# Patient Record
Sex: FEMALE | Race: WHITE | NOT HISPANIC OR LATINO | Employment: UNEMPLOYED | URBAN - METROPOLITAN AREA
[De-identification: names, ages, dates, MRNs, and addresses within clinical notes are randomized per-mention and may not be internally consistent; named-entity substitution may affect disease eponyms.]

---
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
## 2023-01-31 ENCOUNTER — LAB VISIT (OUTPATIENT)
Dept: LAB | Facility: HOSPITAL | Age: 18
End: 2023-01-31
Attending: SPECIALIST
Payer: COMMERCIAL

## 2023-01-31 DIAGNOSIS — Z34.82 PRENATAL CARE, SUBSEQUENT PREGNANCY, SECOND TRIMESTER: Primary | ICD-10-CM

## 2023-01-31 DIAGNOSIS — Z13.1 SCREENING FOR DIABETES MELLITUS: ICD-10-CM

## 2023-01-31 LAB
BASOPHILS # BLD AUTO: 0.02 K/UL (ref 0–0.2)
BASOPHILS NFR BLD: 0.2 % (ref 0–1.9)
DIFFERENTIAL METHOD: ABNORMAL
EOSINOPHIL # BLD AUTO: 0.1 K/UL (ref 0–0.5)
EOSINOPHIL NFR BLD: 0.6 % (ref 0–8)
ERYTHROCYTE [DISTWIDTH] IN BLOOD BY AUTOMATED COUNT: 13.2 % (ref 11.5–14.5)
HCT VFR BLD AUTO: 38.5 % (ref 37–48.5)
HGB BLD-MCNC: 12.9 G/DL (ref 12–16)
IMM GRANULOCYTES # BLD AUTO: 0.04 K/UL (ref 0–0.04)
IMM GRANULOCYTES NFR BLD AUTO: 0.4 % (ref 0–0.5)
LYMPHOCYTES # BLD AUTO: 1.3 K/UL (ref 1–4.8)
LYMPHOCYTES NFR BLD: 14.1 % (ref 18–48)
MCH RBC QN AUTO: 30.3 PG (ref 27–31)
MCHC RBC AUTO-ENTMCNC: 33.5 G/DL (ref 32–36)
MCV RBC AUTO: 90 FL (ref 82–98)
MONOCYTES # BLD AUTO: 0.3 K/UL (ref 0.3–1)
MONOCYTES NFR BLD: 3.4 % (ref 4–15)
NEUTROPHILS # BLD AUTO: 7.3 K/UL (ref 1.8–7.7)
NEUTROPHILS NFR BLD: 81.3 % (ref 38–73)
NRBC BLD-RTO: 0 /100 WBC
PLATELET # BLD AUTO: 263 K/UL (ref 150–450)
PMV BLD AUTO: 10.7 FL (ref 9.2–12.9)
RBC # BLD AUTO: 4.26 M/UL (ref 4–5.4)
WBC # BLD AUTO: 8.99 K/UL (ref 3.9–12.7)

## 2023-01-31 PROCEDURE — 85025 COMPLETE CBC W/AUTO DIFF WBC: CPT | Performed by: SPECIALIST

## 2023-01-31 PROCEDURE — 36415 COLL VENOUS BLD VENIPUNCTURE: CPT | Performed by: SPECIALIST

## 2023-01-31 PROCEDURE — 86592 SYPHILIS TEST NON-TREP QUAL: CPT | Performed by: SPECIALIST

## 2023-01-31 PROCEDURE — 87340 HEPATITIS B SURFACE AG IA: CPT | Performed by: SPECIALIST

## 2023-02-01 LAB — RPR SER QL: NORMAL

## 2023-02-02 LAB — HBV SURFACE AG SERPL QL IA: NEGATIVE

## 2023-02-07 LAB
GLUCOSE SERPL-MCNC: 101 MG/DL
GLUCOSE SERPL-MCNC: 96 MG/DL
GLUCOSE SERPL-MCNC: NORMAL MG/DL (ref 70–110)

## 2023-03-10 ENCOUNTER — OFFICE VISIT (OUTPATIENT)
Dept: URGENT CARE | Facility: CLINIC | Age: 18
End: 2023-03-10
Payer: COMMERCIAL

## 2023-03-10 VITALS
TEMPERATURE: 98 F | BODY MASS INDEX: 34.86 KG/M2 | DIASTOLIC BLOOD PRESSURE: 87 MMHG | OXYGEN SATURATION: 97 % | SYSTOLIC BLOOD PRESSURE: 126 MMHG | HEART RATE: 114 BPM | RESPIRATION RATE: 18 BRPM | HEIGHT: 68 IN | WEIGHT: 230 LBS

## 2023-03-10 DIAGNOSIS — J40 BRONCHITIS: Primary | ICD-10-CM

## 2023-03-10 DIAGNOSIS — R09.82 PND (POST-NASAL DRIP): ICD-10-CM

## 2023-03-10 PROCEDURE — 99213 OFFICE O/P EST LOW 20 MIN: CPT | Mod: S$GLB,,, | Performed by: NURSE PRACTITIONER

## 2023-03-10 PROCEDURE — 99213 PR OFFICE/OUTPT VISIT, EST, LEVL III, 20-29 MIN: ICD-10-PCS | Mod: S$GLB,,, | Performed by: NURSE PRACTITIONER

## 2023-03-10 RX ORDER — AZITHROMYCIN 250 MG/1
TABLET, FILM COATED ORAL
Qty: 6 TABLET | Refills: 0 | Status: SHIPPED | OUTPATIENT
Start: 2023-03-10 | End: 2023-03-15

## 2023-03-10 NOTE — PROGRESS NOTES
"Subjective:       Patient ID: Tatiana Cross is a 17 y.o. female.    Vitals:  height is 5' 8" (1.727 m) and weight is 104.3 kg (230 lb). Her oral temperature is 98.2 °F (36.8 °C). Her blood pressure is 126/87 and her pulse is 114 (abnormal). Her respiration is 18 and oxygen saturation is 97%.     Chief Complaint: Cough    Tatiana Cross presents to clinic with cough that has been present for the last 3 weeks.  Patient denies any fever, chills, shortness a breath at this time.  Patient reports that she has also been experiencing some nasal congestion, postnasal drip, headache and chest discomfort that comes along with a cough.  Patient reports that her cough does yield a productive sputum which has at times been yellow and green but a few times has had small streaks of blood noted.  Patient reports that she is not been taking anything for her symptoms.  Patient reports that she has mentioned this to her primary care provider less than 2 weeks ago and they told her there was nothing wrong with her.  Patient reports that her next visit with OBGYN as this upcoming Monday.    Cough  This is a new problem. The current episode started 1 to 4 weeks ago (3 weeks). The problem has been gradually worsening. The problem occurs constantly. The cough is Productive of sputum. Associated symptoms include chest pain, headaches, nasal congestion and postnasal drip.     Constitution: Negative.   HENT:  Positive for postnasal drip.    Neck: neck negative.   Cardiovascular:  Positive for chest pain.   Eyes: Negative.    Respiratory:  Positive for cough.    Gastrointestinal: Negative.    Endocrine: negative.   Genitourinary: Negative.    Musculoskeletal: Negative.    Skin: Negative.    Neurological:  Positive for headaches.     Objective:      Physical Exam   Constitutional: She is oriented to person, place, and time. She appears well-developed.   HENT:   Head: Normocephalic and atraumatic.   Ears:   Right Ear: External ear normal. "   Left Ear: External ear normal.   Nose: Nose normal.   Mouth/Throat: Mucous membranes are normal.   Eyes: Conjunctivae and lids are normal.   Neck: Trachea normal. Neck supple.   Cardiovascular: Normal rate, regular rhythm and normal heart sounds.   Pulmonary/Chest: Effort normal and breath sounds normal. No respiratory distress.   Abdominal: Normal appearance and bowel sounds are normal. She exhibits no distension and no mass. Soft. There is no abdominal tenderness.   Musculoskeletal: Normal range of motion.         General: Normal range of motion.   Neurological: She is alert and oriented to person, place, and time. She has normal strength.   Skin: Skin is warm, dry, intact, not diaphoretic and not pale.   Psychiatric: Her speech is normal and behavior is normal. Judgment and thought content normal.   Nursing note and vitals reviewed.      Assessment:       1. Bronchitis    2. PND (post-nasal drip)          Plan:         Bronchitis  -     azithromycin (Z-SANDRA) 250 MG tablet; Take 2 tablets by mouth on day 1; Take 1 tablet by mouth on days 2-5  Dispense: 6 tablet; Refill: 0    PND (post-nasal drip)

## 2023-03-18 ENCOUNTER — HOSPITAL ENCOUNTER (OUTPATIENT)
Facility: HOSPITAL | Age: 18
Discharge: HOME | End: 2023-03-18
Attending: SPECIALIST | Admitting: SPECIALIST
Payer: COMMERCIAL

## 2023-03-18 VITALS
HEART RATE: 96 BPM | RESPIRATION RATE: 20 BRPM | DIASTOLIC BLOOD PRESSURE: 57 MMHG | OXYGEN SATURATION: 97 % | SYSTOLIC BLOOD PRESSURE: 131 MMHG

## 2023-03-18 DIAGNOSIS — N89.8 VAGINAL DISCHARGE: ICD-10-CM

## 2023-03-18 LAB
BACTERIA #/AREA URNS HPF: ABNORMAL /HPF
BILIRUB UR QL STRIP: NEGATIVE
CLARITY UR: ABNORMAL
COLOR UR: YELLOW
GLUCOSE UR QL STRIP: NEGATIVE
HGB UR QL STRIP: NEGATIVE
HYALINE CASTS #/AREA URNS LPF: 16 /LPF
KETONES UR QL STRIP: NEGATIVE
LEUKOCYTE ESTERASE UR QL STRIP: ABNORMAL
MICROSCOPIC COMMENT: ABNORMAL
NITRITE UR QL STRIP: NEGATIVE
PH UR STRIP: 6 [PH] (ref 5–8)
PROT UR QL STRIP: ABNORMAL
RBC #/AREA URNS HPF: 4 /HPF (ref 0–4)
SP GR UR STRIP: 1.02 (ref 1–1.03)
SQUAMOUS #/AREA URNS HPF: 9 /HPF
URN SPEC COLLECT METH UR: ABNORMAL
UROBILINOGEN UR STRIP-ACNC: NEGATIVE EU/DL
WBC #/AREA URNS HPF: 13 /HPF (ref 0–5)

## 2023-03-18 PROCEDURE — 99211 OFF/OP EST MAY X REQ PHY/QHP: CPT | Mod: 25

## 2023-03-18 PROCEDURE — 81001 URINALYSIS AUTO W/SCOPE: CPT | Performed by: SPECIALIST

## 2023-03-18 PROCEDURE — 87086 URINE CULTURE/COLONY COUNT: CPT | Performed by: SPECIALIST

## 2023-03-18 PROCEDURE — 59025 FETAL NON-STRESS TEST: CPT

## 2023-03-18 NOTE — DISCHARGE INSTRUCTIONS
Pt is to return to hospital if symptoms worse, leaking of fluid, vaginal bleeding, contractions or concerns. Follow up with dr luevano in office next week or sooner for problems. No sex . Pelvic rest until cleared per dr luevano. Drink plenty of fluids.

## 2023-03-18 NOTE — NURSING
1805- Dr luevano called. Pt tracing reactive. 1 small ctx. Pt has no pain. No leaking. Orders to check pt. If no dilation may send home on pelvic rest and follow up with dr luevano next week.....    1815- pt sve 0/0/-4. No fluid on glove. Small amount white mucous on glove..    1830- pt dishcharged home in stable condition.

## 2023-03-18 NOTE — NURSING
[facility]  History & Physical  Obstetrics   Labor and Delivery Triage    SUBJECTIVE:     Patient Info:  Tatiana Cross         17 y.o.    female    2005     Chief Complaint/Reason for Admission: lost mucous plug    History of Present Illness:  Patient presents at 31 and 5/7 weeks gestation with EDC 05/15/2023.  She presents with contractions every 0 minutes lasting  0  seconds. Other associated symptoms include vaginal discharge. Fetal Movement: normal. Her current obstetrical history is significant for no history.  She reports no leaking and just lost mucous plug and wants to be checked., denies abdominal pain leaking of fluid. Pt reportes losing mucous plug today. Pt mother took picture of mucous plug. Yellow with pink tinge. Small amount. Pt denies odor. Denies abdominal trauma. Pt did report to having sex yesterday morning.  Denies pain. Pt laughing.  .    OB History:   OB History          1    Para   0    Term   0       0    AB   0    Living   0         SAB   0    IAB   0    Ectopic   0    Multiple   0    Live Births   0                   Estimated Date of Delivery: 5/15/23     Gestational Age:  31w5d    Past Medical History:  Past Medical History:   Diagnosis Date    Anxiety     Depression         Past Surgical History:  Past Surgical History:   Procedure Laterality Date    COLONOSCOPY N/A 2020    Procedure: COLONOSCOPY;  Surgeon: Bunny Eaton MD;  Location: [facility];  Service: Endoscopy;  Laterality: N/A;    COLONOSCOPY         Social History:   Alcohol/Tobacco:  Social History     Tobacco Use    Smoking status: Never    Smokeless tobacco: Never   Substance Use Topics    Alcohol use: Not Currently      Drug Use:  Social History     Substance and Sexual Activity   Drug Use Never       Family History:  Family History   Problem Relation Age of Onset    Thyroid disease Mother     Heart disease Paternal Grandmother        Allergies:  Review of patient's allergies  indicates:  No Known Allergies    Meds Prior to Arrival:  Prior to Admission medications    Medication Sig Start Date End Date Taking? Authorizing Provider   albuterol (PROVENTIL/VENTOLIN HFA) 90 mcg/actuation inhaler Inhale 2 puffs into the lungs every 4 (four) hours as needed. 5/25/21   Historical Provider   FLUoxetine 20 MG capsule Take 20 mg by mouth once daily. 7/12/22   Historical Provider   PNV cmb#95-ferrous fumarate-FA (PRENATAL) 28 mg iron- 800 mcg Tab Take 1 tablet by mouth once daily.    Historical Provider   VYVANSE 50 mg capsule Take 50 mg by mouth every morning. 4/5/22   Historical Provider        Review of Systems:  na    OBJECTIVE:     Recent Vitals:  LMP 08/12/2022 (Exact Date)     Exam:    na    na    Lab Results:  No results found for this or any previous visit (from the past 36 hour(s)).  Lab Results   Component Value Date    GROUPTRH O POS 11/09/2022          Diagnostic Studies: fetal monitor    Baseline: 140 bpm    labs: ua sent to lab/       ASSESSMENT/PLAN:     Dx:Vaginal discharge [N89.8]  There are no hospital problems to display for this patient.        Admit to MD: Sanjay Thapa MD    Admit to: triage outpatient    na    Code Status: Full Code       Diagnostic Plan/Orders:  Orders Placed This Encounter   Procedures    Urinalysis, Reflex to Urine Culture Urine, Clean Catch    Vital Signs (Specify Frequency)    Fetal monitoring    Continuous tocometry    Notify clinical provider    Notify clinical provider    Full code    Place in Outpatient        Scheduled Meds/IV Therapy:             PRN Meds:

## 2023-03-20 LAB
BACTERIA UR CULT: NORMAL
BACTERIA UR CULT: NORMAL

## 2023-04-10 LAB — PRENATAL STREP B CULTURE: POSITIVE

## 2023-04-21 ENCOUNTER — HOSPITAL ENCOUNTER (OUTPATIENT)
Facility: HOSPITAL | Age: 18
Discharge: HOME | End: 2023-04-22
Attending: SPECIALIST | Admitting: SPECIALIST
Payer: COMMERCIAL

## 2023-04-21 DIAGNOSIS — O36.8190 DECREASED FETAL MOVEMENT: ICD-10-CM

## 2023-04-21 DIAGNOSIS — R58 HEMORRHAGE: Primary | ICD-10-CM

## 2023-04-21 LAB
BACTERIA #/AREA URNS HPF: NEGATIVE /HPF
BILIRUB UR QL STRIP: NEGATIVE
CLARITY UR: CLEAR
COLOR UR: YELLOW
CTP QC/QA: YES
CTP QC/QA: YES
GLUCOSE UR QL STRIP: NEGATIVE
HGB UR QL STRIP: NEGATIVE
HYALINE CASTS #/AREA URNS LPF: 8 /LPF
KETONES UR QL STRIP: NEGATIVE
LEUKOCYTE ESTERASE UR QL STRIP: ABNORMAL
MICROSCOPIC COMMENT: ABNORMAL
NITRITE UR QL STRIP: NEGATIVE
PH UR STRIP: 7 [PH] (ref 5–8)
POC PH, VAGINAL: 5.5 (ref 4.5–5.5)
PROT UR QL STRIP: ABNORMAL
RBC #/AREA URNS HPF: 1 /HPF (ref 0–4)
RUPTURE OF MEMBRANE: POSITIVE
RUPTURE OF MEMBRANE: POSITIVE
SP GR UR STRIP: 1.01 (ref 1–1.03)
SQUAMOUS #/AREA URNS HPF: 8 /HPF
URN SPEC COLLECT METH UR: ABNORMAL
UROBILINOGEN UR STRIP-ACNC: NEGATIVE EU/DL
WBC #/AREA URNS HPF: 7 /HPF (ref 0–5)

## 2023-04-21 PROCEDURE — 81001 URINALYSIS AUTO W/SCOPE: CPT | Performed by: OBSTETRICS & GYNECOLOGY

## 2023-04-21 RX ORDER — SODIUM CHLORIDE, SODIUM LACTATE, POTASSIUM CHLORIDE, CALCIUM CHLORIDE 600; 310; 30; 20 MG/100ML; MG/100ML; MG/100ML; MG/100ML
INJECTION, SOLUTION INTRAVENOUS CONTINUOUS
Status: DISCONTINUED | OUTPATIENT
Start: 2023-04-21 | End: 2023-04-22 | Stop reason: HOSPADM

## 2023-04-21 RX ORDER — ONDANSETRON 4 MG/1
8 TABLET, ORALLY DISINTEGRATING ORAL EVERY 8 HOURS PRN
Status: DISCONTINUED | OUTPATIENT
Start: 2023-04-21 | End: 2023-04-22

## 2023-04-21 RX ORDER — ACETAMINOPHEN 500 MG
500 TABLET ORAL EVERY 6 HOURS PRN
Status: DISCONTINUED | OUTPATIENT
Start: 2023-04-21 | End: 2023-04-22 | Stop reason: HOSPADM

## 2023-04-22 VITALS
TEMPERATURE: 98 F | SYSTOLIC BLOOD PRESSURE: 136 MMHG | DIASTOLIC BLOOD PRESSURE: 89 MMHG | BODY MASS INDEX: 29.55 KG/M2 | RESPIRATION RATE: 18 BRPM | OXYGEN SATURATION: 98 % | HEIGHT: 68 IN | WEIGHT: 195 LBS | HEART RATE: 68 BPM

## 2023-04-22 LAB
ABO + RH BLD: NORMAL
BASOPHILS # BLD AUTO: 0.02 K/UL (ref 0–0.2)
BASOPHILS NFR BLD: 0.2 % (ref 0–1.9)
BLD GP AB SCN CELLS X3 SERPL QL: NORMAL
DIFFERENTIAL METHOD: ABNORMAL
EOSINOPHIL # BLD AUTO: 0.1 K/UL (ref 0–0.5)
EOSINOPHIL NFR BLD: 1 % (ref 0–8)
ERYTHROCYTE [DISTWIDTH] IN BLOOD BY AUTOMATED COUNT: 12.9 % (ref 11.5–14.5)
HCT VFR BLD AUTO: 34.2 % (ref 37–48.5)
HGB BLD-MCNC: 12 G/DL (ref 12–16)
IMM GRANULOCYTES # BLD AUTO: 0.02 K/UL (ref 0–0.04)
IMM GRANULOCYTES NFR BLD AUTO: 0.2 % (ref 0–0.5)
LYMPHOCYTES # BLD AUTO: 2.1 K/UL (ref 1–4.8)
LYMPHOCYTES NFR BLD: 22.4 % (ref 18–48)
MCH RBC QN AUTO: 30 PG (ref 27–31)
MCHC RBC AUTO-ENTMCNC: 35.1 G/DL (ref 32–36)
MCV RBC AUTO: 86 FL (ref 82–98)
MONOCYTES # BLD AUTO: 0.5 K/UL (ref 0.3–1)
MONOCYTES NFR BLD: 5.8 % (ref 4–15)
NEUTROPHILS # BLD AUTO: 6.6 K/UL (ref 1.8–7.7)
NEUTROPHILS NFR BLD: 70.4 % (ref 38–73)
NRBC BLD-RTO: 0 /100 WBC
PLATELET # BLD AUTO: 239 K/UL (ref 150–450)
PMV BLD AUTO: 11.1 FL (ref 9.2–12.9)
POC PH, VAGINAL: 5.5 (ref 4.5–5.5)
RBC # BLD AUTO: 4 M/UL (ref 4–5.4)
WBC # BLD AUTO: 9.33 K/UL (ref 3.9–12.7)

## 2023-04-22 PROCEDURE — 25000003 PHARM REV CODE 250: Performed by: SPECIALIST

## 2023-04-22 PROCEDURE — 63600175 PHARM REV CODE 636 W HCPCS: Performed by: SPECIALIST

## 2023-04-22 PROCEDURE — 86592 SYPHILIS TEST NON-TREP QUAL: CPT | Performed by: SPECIALIST

## 2023-04-22 PROCEDURE — 85025 COMPLETE CBC W/AUTO DIFF WBC: CPT | Performed by: SPECIALIST

## 2023-04-22 PROCEDURE — 99211 OFF/OP EST MAY X REQ PHY/QHP: CPT

## 2023-04-22 PROCEDURE — 86900 BLOOD TYPING SEROLOGIC ABO: CPT | Performed by: SPECIALIST

## 2023-04-22 RX ORDER — METHYLERGONOVINE MALEATE 0.2 MG/ML
200 INJECTION INTRAVENOUS
Status: DISCONTINUED | OUTPATIENT
Start: 2023-04-22 | End: 2023-04-22 | Stop reason: HOSPADM

## 2023-04-22 RX ORDER — MISOPROSTOL 200 UG/1
800 TABLET ORAL
Status: CANCELLED | OUTPATIENT
Start: 2023-04-22

## 2023-04-22 RX ORDER — CALCIUM CARBONATE 200(500)MG
500 TABLET,CHEWABLE ORAL 3 TIMES DAILY PRN
Status: DISCONTINUED | OUTPATIENT
Start: 2023-04-22 | End: 2023-04-22 | Stop reason: HOSPADM

## 2023-04-22 RX ORDER — TRANEXAMIC ACID 10 MG/ML
1000 INJECTION, SOLUTION INTRAVENOUS ONCE AS NEEDED
Status: DISCONTINUED | OUTPATIENT
Start: 2023-04-22 | End: 2023-04-22 | Stop reason: HOSPADM

## 2023-04-22 RX ORDER — OXYTOCIN-SODIUM CHLORIDE 0.9% IV SOLN 30 UNIT/500ML 30-0.9/5 UT/ML-%
30 SOLUTION INTRAVENOUS ONCE AS NEEDED
Status: DISCONTINUED | OUTPATIENT
Start: 2023-04-22 | End: 2023-04-22 | Stop reason: HOSPADM

## 2023-04-22 RX ORDER — METHYLERGONOVINE MALEATE 0.2 MG/ML
200 INJECTION INTRAVENOUS
Status: CANCELLED | OUTPATIENT
Start: 2023-04-22

## 2023-04-22 RX ORDER — SIMETHICONE 80 MG
1 TABLET,CHEWABLE ORAL 4 TIMES DAILY PRN
Status: DISCONTINUED | OUTPATIENT
Start: 2023-04-22 | End: 2023-04-22 | Stop reason: HOSPADM

## 2023-04-22 RX ORDER — CARBOPROST TROMETHAMINE 250 UG/ML
250 INJECTION, SOLUTION INTRAMUSCULAR
Status: DISCONTINUED | OUTPATIENT
Start: 2023-04-22 | End: 2023-04-22 | Stop reason: HOSPADM

## 2023-04-22 RX ORDER — SODIUM CHLORIDE, SODIUM LACTATE, POTASSIUM CHLORIDE, CALCIUM CHLORIDE 600; 310; 30; 20 MG/100ML; MG/100ML; MG/100ML; MG/100ML
INJECTION, SOLUTION INTRAVENOUS CONTINUOUS
Status: DISCONTINUED | OUTPATIENT
Start: 2023-04-22 | End: 2023-04-22 | Stop reason: HOSPADM

## 2023-04-22 RX ORDER — CARBOPROST TROMETHAMINE 250 UG/ML
250 INJECTION, SOLUTION INTRAMUSCULAR
Status: CANCELLED | OUTPATIENT
Start: 2023-04-22

## 2023-04-22 RX ORDER — ONDANSETRON 4 MG/1
8 TABLET, ORALLY DISINTEGRATING ORAL EVERY 8 HOURS PRN
Status: DISCONTINUED | OUTPATIENT
Start: 2023-04-22 | End: 2023-04-22 | Stop reason: HOSPADM

## 2023-04-22 RX ORDER — LIDOCAINE HYDROCHLORIDE 10 MG/ML
10 INJECTION, SOLUTION EPIDURAL; INFILTRATION; INTRACAUDAL; PERINEURAL ONCE AS NEEDED
Status: DISCONTINUED | OUTPATIENT
Start: 2023-04-22 | End: 2023-04-22 | Stop reason: HOSPADM

## 2023-04-22 RX ORDER — SODIUM CHLORIDE 9 MG/ML
INJECTION, SOLUTION INTRAVENOUS
Status: DISCONTINUED | OUTPATIENT
Start: 2023-04-22 | End: 2023-04-22 | Stop reason: HOSPADM

## 2023-04-22 RX ORDER — VALACYCLOVIR HYDROCHLORIDE 500 MG/1
500 TABLET, FILM COATED ORAL
COMMUNITY
Start: 2023-04-18

## 2023-04-22 RX ORDER — OXYTOCIN-SODIUM CHLORIDE 0.9% IV SOLN 30 UNIT/500ML 30-0.9/5 UT/ML-%
2 SOLUTION INTRAVENOUS ONCE AS NEEDED
Status: DISCONTINUED | OUTPATIENT
Start: 2023-04-22 | End: 2023-04-22 | Stop reason: HOSPADM

## 2023-04-22 RX ORDER — MISOPROSTOL 200 UG/1
800 TABLET ORAL ONCE AS NEEDED
Status: DISCONTINUED | OUTPATIENT
Start: 2023-04-22 | End: 2023-04-22 | Stop reason: HOSPADM

## 2023-04-22 RX ADMIN — PENICILLIN G POTASSIUM 5 MILLION UNITS: 5000000 INJECTION, POWDER, FOR SOLUTION INTRAMUSCULAR; INTRAVENOUS at 02:04

## 2023-04-22 NOTE — DISCHARGE INSTRUCTIONS
Keep your scheduled appointment with your provider.    Call your Doctor if you have any of the following:  Temperature above 100 degrees  Nausea, vomiting and/or diarrhea  Severe headache unrelieved by over the counter Tylenol  Dizziness, or blurred vision  Notable increase in swelling of hands or feet  Notable swelling of face and lips  Difficulty, pain or burning with urination  Foul smelling vaginal discharge  Decreased fetal movement      Come to the hospital if you have any of the following symptoms:  Your water breaks  More than 4-6 contractions in 1 hour for 2 or more hours prior to 36 weeks  Vaginal bleeding like a period    After 28 weeks, you should feel 10 distinct fetal movements within a 2 hour period.    It is recommended that you drink 1/2 a gallon of water each day.  Tea, Soda and Juice are  in addition to this.

## 2023-04-22 NOTE — NURSING
Verbal report received from Dr. Thapa. Negative valsalva and pooling. Negative fern. New orders to monitor til 11 am, then discharge with precautions. If FHR tracing reactive, ok to ambulate. Patient and family updated on plan of care. Agrees with plan at this time.

## 2023-04-22 NOTE — NURSING
Dr. Thapa at bedside. Sterile spec exam performed. Swab for fern collected. Tolerated well. Patient and family updated on plan of care at this time. Will continue to monitor.

## 2023-04-22 NOTE — NURSING
Called to room by patient. States that she was really hoping to have her baby today. Expressed concerns about baby being big and really did not want that to happen. Explained risks (difficulty regulating temperature, jaundice, and developmental delays) and benefits of waiting for spontaneous labor to occur or IOL at 39 weeks. Verbalized understanding. Dr. Thapa notified of patients complaints.

## 2023-04-25 ENCOUNTER — HOSPITAL ENCOUNTER (OUTPATIENT)
Facility: HOSPITAL | Age: 18
Discharge: HOME | End: 2023-04-25
Attending: SPECIALIST | Admitting: SPECIALIST
Payer: COMMERCIAL

## 2023-04-25 VITALS — HEART RATE: 73 BPM | SYSTOLIC BLOOD PRESSURE: 132 MMHG | DIASTOLIC BLOOD PRESSURE: 74 MMHG

## 2023-04-25 DIAGNOSIS — O36.8190 DECREASED FETAL MOVEMENT: ICD-10-CM

## 2023-04-25 LAB — RPR SER QL: NORMAL

## 2023-04-25 PROCEDURE — 59025 FETAL NON-STRESS TEST: CPT

## 2023-05-01 ENCOUNTER — HOSPITAL ENCOUNTER (INPATIENT)
Facility: HOSPITAL | Age: 18
LOS: 3 days | Discharge: HOME | End: 2023-05-04
Attending: SPECIALIST | Admitting: SPECIALIST
Payer: COMMERCIAL

## 2023-05-01 DIAGNOSIS — R58 HEMORRHAGE: ICD-10-CM

## 2023-05-01 DIAGNOSIS — N85.8 ALTERATION IN COMFORT ASSOCIATED WITH UTERINE CONTRACTIONS: ICD-10-CM

## 2023-05-01 PROCEDURE — 86592 SYPHILIS TEST NON-TREP QUAL: CPT | Performed by: SPECIALIST

## 2023-05-01 PROCEDURE — 86900 BLOOD TYPING SEROLOGIC ABO: CPT | Performed by: SPECIALIST

## 2023-05-01 PROCEDURE — 85025 COMPLETE CBC W/AUTO DIFF WBC: CPT | Performed by: SPECIALIST

## 2023-05-01 PROCEDURE — 12000002 HC ACUTE/MED SURGE SEMI-PRIVATE ROOM

## 2023-05-01 PROCEDURE — 25000003 PHARM REV CODE 250: Performed by: SPECIALIST

## 2023-05-01 PROCEDURE — 63600175 PHARM REV CODE 636 W HCPCS: Performed by: SPECIALIST

## 2023-05-01 RX ORDER — SODIUM CHLORIDE, SODIUM LACTATE, POTASSIUM CHLORIDE, CALCIUM CHLORIDE 600; 310; 30; 20 MG/100ML; MG/100ML; MG/100ML; MG/100ML
INJECTION, SOLUTION INTRAVENOUS CONTINUOUS
Status: DISCONTINUED | OUTPATIENT
Start: 2023-05-02 | End: 2023-05-04

## 2023-05-01 RX ORDER — TRANEXAMIC ACID 10 MG/ML
1000 INJECTION, SOLUTION INTRAVENOUS ONCE AS NEEDED
Status: DISCONTINUED | OUTPATIENT
Start: 2023-05-01 | End: 2023-05-04 | Stop reason: HOSPADM

## 2023-05-01 RX ORDER — CALCIUM CARBONATE 200(500)MG
500 TABLET,CHEWABLE ORAL 3 TIMES DAILY PRN
Status: DISCONTINUED | OUTPATIENT
Start: 2023-05-02 | End: 2023-05-04 | Stop reason: HOSPADM

## 2023-05-01 RX ORDER — LIDOCAINE HYDROCHLORIDE 10 MG/ML
10 INJECTION INFILTRATION; PERINEURAL ONCE AS NEEDED
Status: DISCONTINUED | OUTPATIENT
Start: 2023-05-02 | End: 2023-05-04 | Stop reason: HOSPADM

## 2023-05-01 RX ORDER — CARBOPROST TROMETHAMINE 250 UG/ML
250 INJECTION, SOLUTION INTRAMUSCULAR
Status: DISCONTINUED | OUTPATIENT
Start: 2023-05-02 | End: 2023-05-04 | Stop reason: HOSPADM

## 2023-05-01 RX ORDER — SODIUM CHLORIDE 9 MG/ML
INJECTION, SOLUTION INTRAVENOUS
Status: DISCONTINUED | OUTPATIENT
Start: 2023-05-02 | End: 2023-05-04 | Stop reason: HOSPADM

## 2023-05-01 RX ORDER — MISOPROSTOL 200 UG/1
800 TABLET ORAL ONCE AS NEEDED
Status: COMPLETED | OUTPATIENT
Start: 2023-05-02 | End: 2023-05-02

## 2023-05-01 RX ORDER — METHYLERGONOVINE MALEATE 0.2 MG/ML
200 INJECTION INTRAVENOUS
Status: DISCONTINUED | OUTPATIENT
Start: 2023-05-02 | End: 2023-05-02

## 2023-05-01 RX ORDER — SIMETHICONE 80 MG
1 TABLET,CHEWABLE ORAL 4 TIMES DAILY PRN
Status: DISCONTINUED | OUTPATIENT
Start: 2023-05-02 | End: 2023-05-04 | Stop reason: HOSPADM

## 2023-05-01 RX ORDER — OXYTOCIN-SODIUM CHLORIDE 0.9% IV SOLN 30 UNIT/500ML 30-0.9/5 UT/ML-%
30 SOLUTION INTRAVENOUS ONCE AS NEEDED
Status: DISCONTINUED | OUTPATIENT
Start: 2023-05-01 | End: 2023-05-04 | Stop reason: HOSPADM

## 2023-05-01 RX ORDER — PROCHLORPERAZINE EDISYLATE 5 MG/ML
5 INJECTION INTRAMUSCULAR; INTRAVENOUS EVERY 6 HOURS PRN
Status: DISCONTINUED | OUTPATIENT
Start: 2023-05-02 | End: 2023-05-04 | Stop reason: HOSPADM

## 2023-05-01 RX ORDER — OXYTOCIN-SODIUM CHLORIDE 0.9% IV SOLN 30 UNIT/500ML 30-0.9/5 UT/ML-%
0-30 SOLUTION INTRAVENOUS CONTINUOUS
Status: DISCONTINUED | OUTPATIENT
Start: 2023-05-02 | End: 2023-05-04

## 2023-05-01 RX ORDER — OXYTOCIN-SODIUM CHLORIDE 0.9% IV SOLN 30 UNIT/500ML 30-0.9/5 UT/ML-%
2 SOLUTION INTRAVENOUS ONCE AS NEEDED
Status: DISCONTINUED | OUTPATIENT
Start: 2023-05-01 | End: 2023-05-04 | Stop reason: HOSPADM

## 2023-05-01 RX ORDER — ONDANSETRON 4 MG/1
8 TABLET, ORALLY DISINTEGRATING ORAL EVERY 8 HOURS PRN
Status: DISCONTINUED | OUTPATIENT
Start: 2023-05-02 | End: 2023-05-02

## 2023-05-01 RX ADMIN — PENICILLIN G POTASSIUM 5 MILLION UNITS: 5000000 INJECTION, POWDER, FOR SOLUTION INTRAMUSCULAR; INTRAVENOUS at 11:05

## 2023-05-01 RX ADMIN — SODIUM CHLORIDE, SODIUM LACTATE, POTASSIUM CHLORIDE, AND CALCIUM CHLORIDE 1000 ML: .6; .31; .03; .02 INJECTION, SOLUTION INTRAVENOUS at 11:05

## 2023-05-02 ENCOUNTER — ANESTHESIA EVENT (OUTPATIENT)
Dept: OBSTETRICS AND GYNECOLOGY | Facility: HOSPITAL | Age: 18
End: 2023-05-02
Payer: COMMERCIAL

## 2023-05-02 ENCOUNTER — ANESTHESIA (OUTPATIENT)
Dept: OBSTETRICS AND GYNECOLOGY | Facility: HOSPITAL | Age: 18
End: 2023-05-02
Payer: COMMERCIAL

## 2023-05-02 LAB
ABO + RH BLD: NORMAL
AMPHET+METHAMPHET UR QL: NEGATIVE
BARBITURATES UR QL SCN>200 NG/ML: NEGATIVE
BASOPHILS # BLD AUTO: 0.02 K/UL (ref 0–0.2)
BASOPHILS NFR BLD: 0.2 % (ref 0–1.9)
BENZODIAZ UR QL SCN>200 NG/ML: NEGATIVE
BILIRUB UR QL STRIP: NEGATIVE
BLD GP AB SCN CELLS X3 SERPL QL: NORMAL
BUPRENORPHINE UR QL: NEGATIVE
BZE UR QL SCN: NEGATIVE
CANNABINOIDS UR QL SCN: NEGATIVE
CLARITY UR: CLEAR
COLOR UR: YELLOW
CREAT UR-MCNC: 159 MG/DL (ref 15–325)
DIFFERENTIAL METHOD: ABNORMAL
EOSINOPHIL # BLD AUTO: 0 K/UL (ref 0–0.5)
EOSINOPHIL NFR BLD: 0.2 % (ref 0–8)
ERYTHROCYTE [DISTWIDTH] IN BLOOD BY AUTOMATED COUNT: 13.1 % (ref 11.5–14.5)
GLUCOSE UR QL STRIP: NEGATIVE
HCT VFR BLD AUTO: 37.4 % (ref 37–48.5)
HGB BLD-MCNC: 12.9 G/DL (ref 12–16)
HGB UR QL STRIP: NEGATIVE
IMM GRANULOCYTES # BLD AUTO: 0.04 K/UL (ref 0–0.04)
IMM GRANULOCYTES NFR BLD AUTO: 0.3 % (ref 0–0.5)
KETONES UR QL STRIP: 100
LEUKOCYTE ESTERASE UR QL STRIP: NEGATIVE
LYMPHOCYTES # BLD AUTO: 1.6 K/UL (ref 1–4.8)
LYMPHOCYTES NFR BLD: 12.7 % (ref 18–48)
MCH RBC QN AUTO: 29.6 PG (ref 27–31)
MCHC RBC AUTO-ENTMCNC: 34.5 G/DL (ref 32–36)
MCV RBC AUTO: 86 FL (ref 82–98)
MONOCYTES # BLD AUTO: 0.5 K/UL (ref 0.3–1)
MONOCYTES NFR BLD: 4 % (ref 4–15)
NEUTROPHILS # BLD AUTO: 10.1 K/UL (ref 1.8–7.7)
NEUTROPHILS NFR BLD: 82.6 % (ref 38–73)
NITRITE UR QL STRIP: NEGATIVE
NRBC BLD-RTO: 0 /100 WBC
OPIATES UR QL SCN: NEGATIVE
PCP UR QL SCN>25 NG/ML: NEGATIVE
PH UR STRIP: 6 [PH] (ref 5–8)
PLATELET # BLD AUTO: 270 K/UL (ref 150–450)
PMV BLD AUTO: 11.8 FL (ref 9.2–12.9)
PROT UR QL STRIP: ABNORMAL
RBC # BLD AUTO: 4.36 M/UL (ref 4–5.4)
RPR SER QL: NORMAL
SP GR UR STRIP: 1.02 (ref 1–1.03)
TOXICOLOGY INFORMATION: NORMAL
URN SPEC COLLECT METH UR: ABNORMAL
UROBILINOGEN UR STRIP-ACNC: NEGATIVE EU/DL
WBC # BLD AUTO: 12.18 K/UL (ref 3.9–12.7)

## 2023-05-02 PROCEDURE — 59409 PRA ETRICAL CARE,VAG DELIV ONLY: ICD-10-PCS | Mod: ,,, | Performed by: ANESTHESIOLOGY

## 2023-05-02 PROCEDURE — 27200710 HC EPIDURAL INFUSION PUMP SET: Performed by: ANESTHESIOLOGY

## 2023-05-02 PROCEDURE — 81003 URINALYSIS AUTO W/O SCOPE: CPT | Performed by: SPECIALIST

## 2023-05-02 PROCEDURE — 25000003 PHARM REV CODE 250: Performed by: SPECIALIST

## 2023-05-02 PROCEDURE — 72200005 HC VAGINAL DELIVERY LEVEL II

## 2023-05-02 PROCEDURE — 62326 NJX INTERLAMINAR LMBR/SAC: CPT | Performed by: ANESTHESIOLOGY

## 2023-05-02 PROCEDURE — 25000003 PHARM REV CODE 250: Performed by: ANESTHESIOLOGY

## 2023-05-02 PROCEDURE — 80307 DRUG TEST PRSMV CHEM ANLYZR: CPT | Performed by: SPECIALIST

## 2023-05-02 PROCEDURE — 12000002 HC ACUTE/MED SURGE SEMI-PRIVATE ROOM

## 2023-05-02 PROCEDURE — 59409 OBSTETRICAL CARE: CPT | Mod: ,,, | Performed by: ANESTHESIOLOGY

## 2023-05-02 PROCEDURE — C1751 CATH, INF, PER/CENT/MIDLINE: HCPCS | Performed by: ANESTHESIOLOGY

## 2023-05-02 PROCEDURE — 51702 INSERT TEMP BLADDER CATH: CPT

## 2023-05-02 PROCEDURE — 63600175 PHARM REV CODE 636 W HCPCS: Performed by: SPECIALIST

## 2023-05-02 PROCEDURE — 63600175 PHARM REV CODE 636 W HCPCS: Performed by: ANESTHESIOLOGY

## 2023-05-02 RX ORDER — METHYLERGONOVINE MALEATE 0.2 MG/ML
200 INJECTION INTRAVENOUS
Status: DISCONTINUED | OUTPATIENT
Start: 2023-05-02 | End: 2023-05-04 | Stop reason: HOSPADM

## 2023-05-02 RX ORDER — ROPIVACAINE HYDROCHLORIDE 2 MG/ML
20 INJECTION, SOLUTION EPIDURAL; INFILTRATION ONCE
Status: DISCONTINUED | OUTPATIENT
Start: 2023-05-02 | End: 2023-05-04 | Stop reason: HOSPADM

## 2023-05-02 RX ORDER — SIMETHICONE 80 MG
1 TABLET,CHEWABLE ORAL EVERY 6 HOURS PRN
Status: DISCONTINUED | OUTPATIENT
Start: 2023-05-02 | End: 2023-05-04 | Stop reason: HOSPADM

## 2023-05-02 RX ORDER — MISOPROSTOL 200 UG/1
800 TABLET ORAL
Status: DISCONTINUED | OUTPATIENT
Start: 2023-05-02 | End: 2023-05-04 | Stop reason: HOSPADM

## 2023-05-02 RX ORDER — METHYLERGONOVINE MALEATE 0.2 MG/ML
200 INJECTION INTRAVENOUS
Status: DISCONTINUED | OUTPATIENT
Start: 2023-05-02 | End: 2023-05-02

## 2023-05-02 RX ORDER — CARBOPROST TROMETHAMINE 250 UG/ML
250 INJECTION, SOLUTION INTRAMUSCULAR
Status: DISCONTINUED | OUTPATIENT
Start: 2023-05-02 | End: 2023-05-04 | Stop reason: HOSPADM

## 2023-05-02 RX ORDER — FENTANYL/BUPIVACAINE/NS/PF 2MCG/ML-.1
14 PLASTIC BAG, INJECTION (ML) INJECTION CONTINUOUS
Status: DISCONTINUED | OUTPATIENT
Start: 2023-05-02 | End: 2023-05-04

## 2023-05-02 RX ORDER — NALOXONE HCL 0.4 MG/ML
0.4 VIAL (ML) INJECTION SEE ADMIN INSTRUCTIONS
Status: DISCONTINUED | OUTPATIENT
Start: 2023-05-02 | End: 2023-05-04 | Stop reason: HOSPADM

## 2023-05-02 RX ORDER — DIPHENHYDRAMINE HCL 25 MG
25 CAPSULE ORAL EVERY 4 HOURS PRN
Status: DISCONTINUED | OUTPATIENT
Start: 2023-05-02 | End: 2023-05-04 | Stop reason: HOSPADM

## 2023-05-02 RX ORDER — ROPIVACAINE HYDROCHLORIDE 5 MG/ML
20 INJECTION, SOLUTION EPIDURAL; INFILTRATION; PERINEURAL ONCE
Status: DISCONTINUED | OUTPATIENT
Start: 2023-05-02 | End: 2023-05-02

## 2023-05-02 RX ORDER — DOCUSATE SODIUM 100 MG/1
200 CAPSULE, LIQUID FILLED ORAL 2 TIMES DAILY PRN
Status: DISCONTINUED | OUTPATIENT
Start: 2023-05-02 | End: 2023-05-04 | Stop reason: HOSPADM

## 2023-05-02 RX ORDER — PRENATAL WITH FERROUS FUM AND FOLIC ACID 3080; 920; 120; 400; 22; 1.84; 3; 20; 10; 1; 12; 200; 27; 25; 2 [IU]/1; [IU]/1; MG/1; [IU]/1; MG/1; MG/1; MG/1; MG/1; MG/1; MG/1; UG/1; MG/1; MG/1; MG/1; MG/1
1 TABLET ORAL DAILY
Status: DISCONTINUED | OUTPATIENT
Start: 2023-05-03 | End: 2023-05-04 | Stop reason: HOSPADM

## 2023-05-02 RX ORDER — MISOPROSTOL 200 UG/1
800 TABLET ORAL
Status: DISCONTINUED | OUTPATIENT
Start: 2023-05-02 | End: 2023-05-02

## 2023-05-02 RX ORDER — OXYCODONE AND ACETAMINOPHEN 5; 325 MG/1; MG/1
1 TABLET ORAL EVERY 4 HOURS PRN
Status: DISCONTINUED | OUTPATIENT
Start: 2023-05-02 | End: 2023-05-04 | Stop reason: HOSPADM

## 2023-05-02 RX ORDER — ONDANSETRON 4 MG/1
8 TABLET, ORALLY DISINTEGRATING ORAL EVERY 8 HOURS PRN
Status: DISCONTINUED | OUTPATIENT
Start: 2023-05-02 | End: 2023-05-04 | Stop reason: HOSPADM

## 2023-05-02 RX ORDER — HYDROCORTISONE 25 MG/G
CREAM TOPICAL 3 TIMES DAILY PRN
Status: DISCONTINUED | OUTPATIENT
Start: 2023-05-02 | End: 2023-05-04 | Stop reason: HOSPADM

## 2023-05-02 RX ORDER — BUPIVACAINE HYDROCHLORIDE 5 MG/ML
10 INJECTION, SOLUTION EPIDURAL; INTRACAUDAL ONCE
Status: DISCONTINUED | OUTPATIENT
Start: 2023-05-02 | End: 2023-05-02

## 2023-05-02 RX ORDER — ROPIVACAINE HYDROCHLORIDE 2 MG/ML
INJECTION, SOLUTION EPIDURAL; INFILTRATION
Status: COMPLETED | OUTPATIENT
Start: 2023-05-02 | End: 2023-05-02

## 2023-05-02 RX ORDER — EPHEDRINE SULFATE 50 MG/ML
5 INJECTION, SOLUTION INTRAVENOUS ONCE AS NEEDED
Status: DISCONTINUED | OUTPATIENT
Start: 2023-05-02 | End: 2023-05-04 | Stop reason: HOSPADM

## 2023-05-02 RX ORDER — ACETAMINOPHEN 325 MG/1
650 TABLET ORAL EVERY 6 HOURS PRN
Status: DISCONTINUED | OUTPATIENT
Start: 2023-05-02 | End: 2023-05-04 | Stop reason: HOSPADM

## 2023-05-02 RX ORDER — DIPHENHYDRAMINE HYDROCHLORIDE 50 MG/ML
12.5 INJECTION INTRAMUSCULAR; INTRAVENOUS EVERY 4 HOURS PRN
Status: DISCONTINUED | OUTPATIENT
Start: 2023-05-02 | End: 2023-05-04 | Stop reason: HOSPADM

## 2023-05-02 RX ORDER — BUPIVACAINE HYDROCHLORIDE 5 MG/ML
10 INJECTION, SOLUTION EPIDURAL; INTRACAUDAL ONCE
Status: DISCONTINUED | OUTPATIENT
Start: 2023-05-02 | End: 2023-05-04 | Stop reason: HOSPADM

## 2023-05-02 RX ORDER — ROPIVACAINE HYDROCHLORIDE 2 MG/ML
20 INJECTION, SOLUTION EPIDURAL; INFILTRATION ONCE
Status: DISCONTINUED | OUTPATIENT
Start: 2023-05-02 | End: 2023-05-02

## 2023-05-02 RX ORDER — ONDANSETRON 2 MG/ML
4 INJECTION INTRAMUSCULAR; INTRAVENOUS ONCE AS NEEDED
Status: DISCONTINUED | OUTPATIENT
Start: 2023-05-02 | End: 2023-05-04 | Stop reason: HOSPADM

## 2023-05-02 RX ADMIN — IBUPROFEN 600 MG: 400 TABLET ORAL at 10:05

## 2023-05-02 RX ADMIN — ROPIVACAINE HYDROCHLORIDE 10 ML: 2 INJECTION, SOLUTION EPIDURAL; INFILTRATION; PERINEURAL at 01:05

## 2023-05-02 RX ADMIN — SODIUM CHLORIDE, SODIUM LACTATE, POTASSIUM CHLORIDE, AND CALCIUM CHLORIDE: .6; .31; .03; .02 INJECTION, SOLUTION INTRAVENOUS at 12:05

## 2023-05-02 RX ADMIN — Medication: at 06:05

## 2023-05-02 RX ADMIN — PENICILLIN G POTASSIUM 3 MILLION UNITS: 5000000 INJECTION, POWDER, FOR SOLUTION INTRAMUSCULAR; INTRAVENOUS at 08:05

## 2023-05-02 RX ADMIN — SODIUM CHLORIDE, SODIUM LACTATE, POTASSIUM CHLORIDE, AND CALCIUM CHLORIDE: .6; .31; .03; .02 INJECTION, SOLUTION INTRAVENOUS at 10:05

## 2023-05-02 RX ADMIN — CALCIUM CARBONATE 500 MG: 500 TABLET, CHEWABLE ORAL at 07:05

## 2023-05-02 RX ADMIN — MISOPROSTOL 400 MCG: 200 TABLET ORAL at 12:05

## 2023-05-02 RX ADMIN — Medication 14 ML/HR: at 01:05

## 2023-05-02 RX ADMIN — OXYTOCIN 2 MILLI-UNITS/MIN: 10 INJECTION, SOLUTION INTRAMUSCULAR; INTRAVENOUS at 10:05

## 2023-05-02 RX ADMIN — BENZOCAINE AND LEVOMENTHOL: 200; 5 SPRAY TOPICAL at 06:05

## 2023-05-02 RX ADMIN — PENICILLIN G POTASSIUM 3 MILLION UNITS: 5000000 INJECTION, POWDER, FOR SOLUTION INTRAMUSCULAR; INTRAVENOUS at 04:05

## 2023-05-02 NOTE — ANESTHESIA PROCEDURE NOTES
Epidural    Patient location during procedure: OB   Reason for block: primary anesthetic   Reason for block: labor analgesia requested by patient and obstetrician  Diagnosis: Intrauterine pregnancy   Start time: 5/2/2023 1:16 AM  Timeout: 5/2/2023 1:15 AM  End time: 5/2/2023 1:26 AM    Staffing  Performing Provider: Roly Jiang Jr., MD  Authorizing Provider: Roly Jiang Jr., MD        Preanesthetic Checklist  Completed: patient identified, IV checked, site marked, risks and benefits discussed, surgical consent, monitors and equipment checked, pre-op evaluation, timeout performed, anesthesia consent given, hand hygiene performed and patient being monitored  Preparation  Patient position: sitting  Prep: Betadine and ChloraPrep  Patient monitoring: ECG and Blood Pressure  Reason for block: primary anesthetic   Epidural  Skin Anesthetic: lidocaine 1%  Skin Wheal: 3 mL  Administration type: continuous  Approach: midline  Interspace: L3-4    Injection technique: MARCELINO air  Needle and Epidural Catheter  Needle type: Tuohy   Needle gauge: 17  Needle length: 3.5 inches  Catheter type: springwound and multi-orifice  Catheter size: 19 G  Insertion Attempts: 1  Test dose: 3 mL of lidocaine 1.5% with Epi 1-to-200,000  Additional Documentation: incremental injection, negative aspiration for heme and CSF, no paresthesia on injection, no significant pain on injection, no significant complaints from patient and no signs/symptoms of IV or SA injection  Needle localization: anatomical landmarks  Assessment  Upper dermatomal levels - Left: T6  Right: T6   Dermatomal levels determined by pinch or prick  Ease of block: easy  Patient's tolerance of the procedure: no complaints and comfortable throughout block No inadvertent dural puncture with Tuohy.  Dural puncture not performed with spinal needle    Medications:    Medications: ropivacaine (NAROPIN) solution 0.2% - Epidural   10 mL - 5/2/2023 1:24:00 AM

## 2023-05-02 NOTE — LACTATION NOTE
"   05/02/23 3695   Maternal Assessment   Breast Density Bilateral:;soft   Areola Bilateral:;elastic   Nipples Bilateral:;short;everted   Maternal Infant Feeding   Maternal Emotional State assist needed   Infant Positioning clutch/football   Signs of Milk Transfer audible swallow;infant jaw motion present   Pain with Feeding no   Comfort Measures Before/During Feeding infant position adjusted;latch adjusted;maternal position adjusted   Latch Assistance no     Assisted to latch baby to left breast in football position. Baby Latched deeply, after several attempts, nursing briefly and then comes off of breast. Baby very "snorty" and releases breasts to breathe through mouth. Reviewed basic breastfeeding instructions and encouraged to call me for assistance with next feeding. Patient verbalizes understanding of all instructions with good recall.    Instructed on proper latch to facilitate effective breastfeeding.  Discussed recognizing hunger cues, appropriate positioning and wide mouth latch.  Discussed ways to determine an effective latch including:  areola included in latch, rhythmic/nutritive sucking and audible swallowing.  Also discussed soreness/tenderness associated with latch and prevention and treatment.  Pt states understanding and verbalized appropriate recall.    "

## 2023-05-02 NOTE — NURSING
Nurses Note -- 4 Eyes      5/2/2023   2:36 AM      Skin assessed during: Admit      [x] No Altered Skin Integrity Present    []Prevention Measures Documented      [] Yes- Altered Skin Integrity Present or Discovered   [] LDA Added if Not in Epic (Describe Wound)   [] New Altered Skin Integrity was Present on Admit and Documented in LDA   [] Wound Image Taken    Wound Care Consulted? No    Attending Nurse:  Indira Anderson RN     Second RN/Staff Member:  YASMEEN Levine

## 2023-05-02 NOTE — NURSING TRANSFER
Nursing Transfer Note      5/2/2023     Reason patient is being transferred: delivered    Transfer To: 2109    Transfer via wheelchair    Transfer with belongings    Transported by Kamille Delvalle RN    Telemetry: n/a    Medicines sent: none    Any special needs or follow-up needed: none    Chart send with patient: Yes    Notified: family    Patient reassessed at: 5/2/23 @ 1515  1  Upon arrival to floor: patient oriented to room, call bell in reach, and bed in lowest position    Report to Lis Dominguez RN

## 2023-05-02 NOTE — NURSING
"[facility]   Labor and Delivery Triage    SUBJECTIVE:     Patient Info:  Tatiana Cross         17 y.o.    female    2005     Chief Complaint/Reason for Admission: contractions    Triage Assessment:  Pt presents to [facility] L&D unit with c/o contractions every 2-3 min. EFM applied. Abdomen soft, nontender. +FM per pt. -vaginal bleeding.  POC discussed, V/U.       OB History:   OB History          1    Para   0    Term   0       0    AB   0    Living   0         SAB   0    IAB   0    Ectopic   0    Multiple   0    Live Births   0                   Estimated Date of Delivery: 5/15/23     Gestational Age:  38w1d    Allergies:  Review of patient's allergies indicates:  No Known Allergies      OBJECTIVE:     Recent Vitals:  /67 (BP Location: Right arm, Patient Position: Lying)   Pulse (!) 57   Temp 97.8 °F (36.6 °C) (Oral)   Resp 18   Ht 5' 7" (1.702 m)   Wt 121.6 kg (268 lb)   LMP 2022 (Exact Date)     Exam:    dilated to 3.5 cm and 90% /-2      Lab Results:  Recent Results (from the past 36 hour(s))   CBC with Auto Differential    Collection Time: 23 11:43 PM   Result Value Ref Range    WBC 12.18 3.90 - 12.70 K/uL    RBC 4.36 4.00 - 5.40 M/uL    Hemoglobin 12.9 12.0 - 16.0 g/dL    Hematocrit 37.4 37.0 - 48.5 %    MCV 86 82 - 98 fL    MCH 29.6 27.0 - 31.0 pg    MCHC 34.5 32.0 - 36.0 g/dL    RDW 13.1 11.5 - 14.5 %    Platelets 270 150 - 450 K/uL    MPV 11.8 9.2 - 12.9 fL    Immature Granulocytes 0.3 0.0 - 0.5 %    Gran # (ANC) 10.1 (H) 1.8 - 7.7 K/uL    Immature Grans (Abs) 0.04 0.00 - 0.04 K/uL    Lymph # 1.6 1.0 - 4.8 K/uL    Mono # 0.5 0.3 - 1.0 K/uL    Eos # 0.0 0.0 - 0.5 K/uL    Baso # 0.02 0.00 - 0.20 K/uL    nRBC 0 0 /100 WBC    Gran % 82.6 (H) 38.0 - 73.0 %    Lymph % 12.7 (L) 18.0 - 48.0 %    Mono % 4.0 4.0 - 15.0 %    Eosinophil % 0.2 0.0 - 8.0 %    Basophil % 0.2 0.0 - 1.9 %    Differential Method Automated    Type & Screen, Labor & Delivery    Collection " Time: 23 11:43 PM   Result Value Ref Range    Group & Rh O POS     Indirect Alie NEG    Urinalysis, Reflex to Urine Culture Urine, Clean Catch    Collection Time: 23  1:56 AM    Specimen: Urine, Catheterized   Result Value Ref Range    Specimen UA Urine, Clean Catch     Color, UA Yellow Yellow, Straw, Anisa    Appearance, UA Clear Clear    pH, UA 6.0 5.0 - 8.0    Specific Gravity, UA 1.025 1.005 - 1.030    Protein, UA Trace (A) Negative    Glucose, UA Negative Negative    Ketones,  Negative    Bilirubin (UA) Negative Negative    Occult Blood UA Negative Negative    Nitrite, UA Negative Negative    Urobilinogen, UA Negative Negative EU/dL    Leukocytes, UA Negative Negative   Drug screen panel, emergency    Collection Time: 23  1:56 AM   Result Value Ref Range    Benzodiazepines Negative Negative    Cocaine (Metab.) Negative Negative    Opiate Scrn, Ur Negative Negative    Barbiturate Screen, Ur Negative Negative    Amphetamine Screen, Ur Negative Negative    THC Negative Negative    Phencyclidine Negative Negative    Creatinine, Urine 159.0 15.0 - 325.0 mg/dL    Toxicology Information SEE COMMENT    Buprenorphine, Urine    Collection Time: 23  1:56 AM   Result Value Ref Range    BUPRENORPHINE Negative      Lab Results   Component Value Date    GROUPTRH O POS 2023          Diagnostic Studies:    Baseline: 140 bpm, Variability: Good {> 6 bpm), and Accelerations: Reactive    labs: UA admit labs    COMMUNICATION WITH PROVIDER:     Dr. Thapa notified of pt arrival  @ 38wks with c/o contractions every 2-3 min since  SVE 3.5/90/-2 pt requesting epidural placement for pain.  Orders to admit and start PCN for +GBS; pt may have epidural noted.

## 2023-05-02 NOTE — NURSING
0602 Dr. Thapa called unit for patient update.  Notified pt currently 9/100/0 SROM at 0008; pt has epidural and is comfortable.  Pitocin has not been started.  Orders to continue with current plan of care noted.

## 2023-05-02 NOTE — ANESTHESIA PREPROCEDURE EVALUATION
05/02/2023  Tatiana Cross is a 17 y.o., female.      Pre-op Assessment    I have reviewed the Patient Summary Reports.     I have reviewed the Nursing Notes. I have reviewed the NPO Status.   I have reviewed the Medications.     Review of Systems  Anesthesia Hx:  No problems with previous Anesthesia  Neg history of prior surgery. Denies Family Hx of Anesthesia complications.   Denies Personal Hx of Anesthesia complications.   Social:  No Alcohol Use, Non-Smoker    Hematology/Oncology:  Hematology Normal   Oncology Normal     EENT/Dental:EENT/Dental Normal   Cardiovascular:  Cardiovascular Normal     Pulmonary:   Asthma    Renal/:  Renal/ Normal     Hepatic/GI:  Hepatic/GI Normal    Musculoskeletal:  Musculoskeletal Normal    Neurological:  Neurology Normal    Endocrine:  Endocrine Normal    Dermatological:  Skin Normal    Psych:   depression          Physical Exam  General: Well nourished and Alert    Airway:  Mallampati: II   Mouth Opening: Normal  TM Distance: Normal  Tongue: Normal  Neck ROM: Normal ROM    Dental:  Intact    Chest/Lungs:  Clear to auscultation, Normal Respiratory Rate    Heart:  Rate: Normal  Rhythm: Regular Rhythm  Sounds: Normal        Anesthesia Plan  Type of Anesthesia, risks & benefits discussed:    Anesthesia Type: Epidural  Intra-op Monitoring Plan: Standard ASA Monitors  Informed Consent: Informed consent signed with the Patient and all parties understand the risks and agree with anesthesia plan.  All questions answered.   ASA Score: 2    Ready For Surgery From Anesthesia Perspective.     .

## 2023-05-02 NOTE — PLAN OF CARE
Problem: Adult Inpatient Plan of Care  Goal: Plan of Care Review  Outcome: Ongoing, Progressing  Goal: Patient-Specific Goal (Individualized)  Outcome: Ongoing, Progressing  Goal: Absence of Hospital-Acquired Illness or Injury  Outcome: Ongoing, Progressing  Goal: Optimal Comfort and Wellbeing  Outcome: Ongoing, Progressing  Goal: Readiness for Transition of Care  Outcome: Ongoing, Progressing     Problem: Bariatric Environmental Safety  Goal: Safety Maintained with Care  Outcome: Ongoing, Progressing     Problem:  Fall Injury Risk  Goal: Absence of Fall, Infant Drop and Related Injury  Outcome: Ongoing, Progressing     Problem: Infection  Goal: Absence of Infection Signs and Symptoms  Outcome: Ongoing, Progressing     Problem: Bleeding (Labor)  Goal: Hemostasis  Outcome: Ongoing, Progressing     Problem: Change in Fetal Wellbeing (Labor)  Goal: Stable Fetal Wellbeing  Outcome: Ongoing, Progressing     Problem: Delayed Labor Progression (Labor)  Goal: Effective Progression to Delivery  Outcome: Ongoing, Progressing     Problem: Infection (Labor)  Goal: Absence of Infection Signs and Symptoms  Outcome: Ongoing, Progressing     Problem: Labor Pain (Labor)  Goal: Acceptable Pain Control  Outcome: Ongoing, Progressing     Problem: Uterine Tachysystole (Labor)  Goal: Normal Uterine Contraction Pattern  Outcome: Ongoing, Progressing     Problem: Breastfeeding  Goal: Effective Breastfeeding  Outcome: Ongoing, Progressing

## 2023-05-03 LAB
BASOPHILS # BLD AUTO: 0.02 K/UL (ref 0–0.2)
BASOPHILS NFR BLD: 0.2 % (ref 0–1.9)
DIFFERENTIAL METHOD: ABNORMAL
EOSINOPHIL # BLD AUTO: 0.1 K/UL (ref 0–0.5)
EOSINOPHIL NFR BLD: 1 % (ref 0–8)
ERYTHROCYTE [DISTWIDTH] IN BLOOD BY AUTOMATED COUNT: 13.4 % (ref 11.5–14.5)
HCT VFR BLD AUTO: 32.8 % (ref 37–48.5)
HGB BLD-MCNC: 11.1 G/DL (ref 12–16)
IMM GRANULOCYTES # BLD AUTO: 0.04 K/UL (ref 0–0.04)
IMM GRANULOCYTES NFR BLD AUTO: 0.3 % (ref 0–0.5)
LYMPHOCYTES # BLD AUTO: 2.4 K/UL (ref 1–4.8)
LYMPHOCYTES NFR BLD: 20.7 % (ref 18–48)
MCH RBC QN AUTO: 29.8 PG (ref 27–31)
MCHC RBC AUTO-ENTMCNC: 33.8 G/DL (ref 32–36)
MCV RBC AUTO: 88 FL (ref 82–98)
MONOCYTES # BLD AUTO: 0.8 K/UL (ref 0.3–1)
MONOCYTES NFR BLD: 6.9 % (ref 4–15)
NEUTROPHILS # BLD AUTO: 8.2 K/UL (ref 1.8–7.7)
NEUTROPHILS NFR BLD: 70.9 % (ref 38–73)
NRBC BLD-RTO: 0 /100 WBC
PLATELET # BLD AUTO: 225 K/UL (ref 150–450)
PMV BLD AUTO: 11.6 FL (ref 9.2–12.9)
RBC # BLD AUTO: 3.73 M/UL (ref 4–5.4)
WBC # BLD AUTO: 11.49 K/UL (ref 3.9–12.7)

## 2023-05-03 PROCEDURE — 36415 COLL VENOUS BLD VENIPUNCTURE: CPT | Performed by: SPECIALIST

## 2023-05-03 PROCEDURE — 85025 COMPLETE CBC W/AUTO DIFF WBC: CPT | Performed by: SPECIALIST

## 2023-05-03 PROCEDURE — 12000002 HC ACUTE/MED SURGE SEMI-PRIVATE ROOM

## 2023-05-03 PROCEDURE — 25000003 PHARM REV CODE 250: Performed by: SPECIALIST

## 2023-05-03 RX ADMIN — IBUPROFEN 600 MG: 400 TABLET ORAL at 06:05

## 2023-05-03 RX ADMIN — IBUPROFEN 600 MG: 400 TABLET ORAL at 12:05

## 2023-05-03 RX ADMIN — DOCUSATE SODIUM 200 MG: 100 CAPSULE, LIQUID FILLED ORAL at 08:05

## 2023-05-03 RX ADMIN — IBUPROFEN 600 MG: 400 TABLET ORAL at 05:05

## 2023-05-03 RX ADMIN — OXYCODONE AND ACETAMINOPHEN 1 TABLET: 325; 5 TABLET ORAL at 06:05

## 2023-05-03 RX ADMIN — OXYCODONE AND ACETAMINOPHEN 1 TABLET: 325; 5 TABLET ORAL at 12:05

## 2023-05-03 RX ADMIN — PRENATAL VIT W/ FE FUMARATE-FA TAB 27-0.8 MG 1 TABLET: 27-0.8 TAB at 08:05

## 2023-05-03 NOTE — PLAN OF CARE
OB Screen Completed       23 1316   OB SCREEN   Assessment Type Discharge Planning Assessment   Source of Information health record   Received Prenatal Care Yes   Any indications/suspicions for None   Is this a teen pregnancy No   Is the baby in NICU No   Indication for adoption/Safe Haven No   Indication for DME/post-acute needs No   HIV (+) No   Any congenital  disorders No   Fetal demise/ death No

## 2023-05-03 NOTE — L&D DELIVERY NOTE
[facility]  Vaginal Delivery Note    SUMMARY     Patient underwent normal spontaneous vaginal delivery over an intact perineum with 1 st degree right labila lac.  The placenta was delivered intact and spontaneous.  The uterus and vagina were swept clean. The patient had an epidural for anesthesia . The estimated blood loss was 150.  No other complications were noted. Baby delivered was a male infant with Apgar8/9. .  Patient tolerated delivery well. Sponge needle and lap counted correctly x2. Both mother and baby are in stable condition.        Indications: Abdominal pain  Pregnancy complicated by:   Patient Active Problem List   Diagnosis    Rectal bleeding    Obese abdomen    Obesity (BMI 35.0-39.9 without comorbidity)    Anal pain    Hemorrhoids    Abdominal pain    Attention deficit hyperactivity disorder    Melanocytic nevus     Admitting GA: 38w1d

## 2023-05-03 NOTE — LACTATION NOTE
05/03/23 1600   Maternal Assessment   Breast Density Bilateral:;soft;filling   Areola Bilateral:   Nipples Bilateral:;everted   Maternal Infant Feeding   Maternal Emotional State assist needed   Infant Positioning clutch/football   Signs of Milk Transfer audible swallow;breasts soften with feeding;infant jaw motion present   Pain with Feeding no   Comfort Measures Before/During Feeding infant position adjusted;latch adjusted;maternal position adjusted   Latch Assistance yes     Baby very sleepy, has not been interested in feeding. Stimulated baby to wakeful state and assisted to latch to left breast in football position with nipple shield in place. Baby latched deeply to shield, nursing well with stimulation, with audible swallows. Mother denies pain during feeding. Transferred baby to bare nipple after several minutes. Baby latched after several attempts, nursing well with audible swallows. Reviewed basic breastfeeding instructions and techniques for keeping sleepy baby awake during feeding. Encouraged to call me for any further breastfeeding assistance. Patient verbalizes understanding of all instructions with good recall.

## 2023-05-03 NOTE — ANESTHESIA POSTPROCEDURE EVALUATION
Anesthesia Post Evaluation    Patient: Tatiana Cross    Procedure(s) Performed: * No procedures listed *    Final Anesthesia Type: epidural      Patient location during evaluation: floor  Patient participation: Yes- Able to Participate  Level of consciousness: awake and alert  Post-procedure vital signs: reviewed and stable  Pain management: adequate  Airway patency: patent    PONV status at discharge: No PONV  Anesthetic complications: no      Cardiovascular status: blood pressure returned to baseline  Respiratory status: unassisted  Hydration status: euvolemic  Follow-up not needed.          Vitals Value Taken Time   /78 05/03/23 0900   Temp 36.5 °C (97.7 °F) 05/03/23 0900   Pulse 63 05/03/23 0900   Resp 17 05/03/23 0900   SpO2 98 % 05/03/23 0900         No case tracking events are documented in the log.      Pain/Luis Score: Pain Rating Prior to Med Admin: 5 (5/3/2023  5:16 AM)  Pain Rating Post Med Admin: 2 (5/3/2023  5:50 AM)

## 2023-05-03 NOTE — PLAN OF CARE
Patient educated on bleeding , clots , when to call nurse ,  VSS, lochia wdl, voids without complication, + flatus ,pain controlled with prn pain medication      Problem: Adult Inpatient Plan of Care  Goal: Plan of Care Review  Outcome: Ongoing, Progressing     Problem: Pain (Postpartum Vaginal Delivery)  Goal: Acceptable Pain Control  Outcome: Ongoing, Progressing     Problem: Urinary Retention (Postpartum Vaginal Delivery)  Goal: Effective Urinary Elimination  Outcome: Met

## 2023-05-04 VITALS
SYSTOLIC BLOOD PRESSURE: 125 MMHG | DIASTOLIC BLOOD PRESSURE: 74 MMHG | TEMPERATURE: 98 F | HEIGHT: 67 IN | BODY MASS INDEX: 42.06 KG/M2 | RESPIRATION RATE: 17 BRPM | OXYGEN SATURATION: 98 % | WEIGHT: 268 LBS | HEART RATE: 72 BPM

## 2023-05-04 PROCEDURE — 25000003 PHARM REV CODE 250: Performed by: SPECIALIST

## 2023-05-04 RX ADMIN — IBUPROFEN 600 MG: 400 TABLET ORAL at 03:05

## 2023-05-04 RX ADMIN — PRENATAL VIT W/ FE FUMARATE-FA TAB 27-0.8 MG 1 TABLET: 27-0.8 TAB at 10:05

## 2023-05-04 RX ADMIN — IBUPROFEN 600 MG: 400 TABLET ORAL at 10:05

## 2023-05-04 NOTE — PLAN OF CARE
Problem: Adult Inpatient Plan of Care  Goal: Plan of Care Review  Outcome: Met  Goal: Patient-Specific Goal (Individualized)  Outcome: Met  Goal: Absence of Hospital-Acquired Illness or Injury  Outcome: Met  Goal: Optimal Comfort and Wellbeing  Outcome: Met  Goal: Readiness for Transition of Care  Outcome: Met     Problem: Bariatric Environmental Safety  Goal: Safety Maintained with Care  Outcome: Met     Problem:  Fall Injury Risk  Goal: Absence of Fall, Infant Drop and Related Injury  Outcome: Met     Problem: Infection  Goal: Absence of Infection Signs and Symptoms  Outcome: Met     Problem: Bleeding (Labor)  Goal: Hemostasis  Outcome: Met     Problem: Change in Fetal Wellbeing (Labor)  Goal: Stable Fetal Wellbeing  Outcome: Met     Problem: Delayed Labor Progression (Labor)  Goal: Effective Progression to Delivery  Outcome: Met     Problem: Infection (Labor)  Goal: Absence of Infection Signs and Symptoms  Outcome: Met     Problem: Labor Pain (Labor)  Goal: Acceptable Pain Control  Outcome: Met     Problem: Uterine Tachysystole (Labor)  Goal: Normal Uterine Contraction Pattern  Outcome: Met     Problem: Breastfeeding  Goal: Effective Breastfeeding  Outcome: Met     Problem: Adjustment to Role Transition (Postpartum Vaginal Delivery)  Goal: Successful Maternal Role Transition  Outcome: Met     Problem: Bleeding (Postpartum Vaginal Delivery)  Goal: Hemostasis  Outcome: Met     Problem: Infection (Postpartum Vaginal Delivery)  Goal: Absence of Infection Signs/Symptoms  Outcome: Met     Problem: Pain (Postpartum Vaginal Delivery)  Goal: Acceptable Pain Control  Outcome: Met

## 2023-05-04 NOTE — DISCHARGE SUMMARY
[facility]  Obstetrics  Discharge Summary      Patient Name: Tatiana Cross  MRN: [id number]  Admission Date: 5/1/2023  Hospital Length of Stay: 3 days  Discharge Date and Time:  05/04/2023 1:13 PM  Attending Physician: Sanjay Pope MD   Discharging Provider: Sanjay Pope MD   Primary Care Provider: Sanjay Pope MD    HPI: No notes on file        * No surgery found *     Hospital Course:   No notes on file     Consults (From admission, onward)        Status Ordering Provider     Inpatient consult to Anesthesiology  Once        Provider:  (Not yet assigned)    Acknowledged SANJAY POPE          Final Active Diagnoses:    Diagnosis Date Noted POA    PRINCIPAL PROBLEM:  Abdominal pain [R10.9] 11/08/2021 Yes      Problems Resolved During this Admission:        Significant Diagnostic Studies: Labs:   CBC   Recent Labs   Lab 05/03/23  0457   WBC 11.49   HGB 11.1*   HCT 32.8*        Lab Results   Component Value Date    GROUPTRH O POS 05/01/2023         Feeding Method: breast    Immunizations     Date Immunization Status Dose Route/Site Given by    05/02/23 2015 MMR Incomplete 0.5 mL Subcutaneous/     05/02/23 2015 Tdap Incomplete 0.5 mL Intramuscular/           Delivery:    Episiotomy: None   Lacerations: 1st   Repair suture:     Repair # of packets: 1   Blood loss (ml):       Birth information:  YOB: 2025   Time of birth: 12:07 PM   Sex: male   Delivery type: Vaginal, Spontaneous   Gestational Age: 38w1d    Delivery Clinician:      Other providers:       Additional  information:  Forceps:    Vacuum:    Breech:    Observed anomalies      Living?:           APGARS  One minute Five minutes Ten minutes   Skin color:         Heart rate:         Grimace:         Muscle tone:         Breathing:         Totals: 9  9        Placenta: Delivered:       appearance    Pending Diagnostic Studies:     None          Discharged Condition: good    Disposition: Home or Self Care    Follow  Up:   Follow-up Information     Sanjay Thapa MD Follow up in 6 week(s).    Specialties: Obstetrics, Obstetrics and Gynecology  Contact information:  [address]  [place], [place], [place] [facility] [address]  [phone]             Sanjay Thapa MD Follow up in 6 week(s).    Specialties: Obstetrics, Obstetrics and Gynecology  Contact information:  [address]  [place], [place], [place] [facility] [address]  [phone]                       Patient Instructions:      BREAST PUMP FOR HOME USE     Order Specific Question Answer Comments   Type of pump: Electric    Weight: 121.6 kg (268 lb)    Does patient have medical equipment at home? none    Length of need (1-99 months): 99      Diet Adult Regular     Diet Adult Regular     Remove dressing in 24 hours     Pelvic Rest     Notify your health care provider if you experience any of the following:  temperature >100.4     Remove dressing in 24 hours     Pelvic Rest     Notify your health care provider if you experience any of the following:  temperature >100.4     Notify your health care provider if you experience any of the following:  persistent nausea and vomiting or diarrhea     Notify your health care provider if you experience any of the following:  severe uncontrolled pain     Notify your health care provider if you experience any of the following:  redness, tenderness, or signs of infection (pain, swelling, redness, odor or green/yellow discharge around incision site)     Activity as tolerated     Activity as tolerated     Medications:  Current Discharge Medication List      CONTINUE these medications which have NOT CHANGED    Details   albuterol (PROVENTIL/VENTOLIN HFA) 90 mcg/actuation inhaler Inhale 2 puffs into the lungs every 4 (four) hours as needed.      PNV cmb#95-ferrous fumarate-FA (PRENATAL) 28 mg iron- 800 mcg Tab Take 1 tablet by mouth once daily.      valACYclovir (VALTREX) 500 MG tablet Take 500 mg by mouth.       FLUoxetine 20 MG capsule Take 20 mg by mouth once daily.      VYVANSE 50 mg capsule Take 50 mg by mouth every morning.             Sanjay Thapa MD  Obstetrics  [facility]

## 2023-05-04 NOTE — DISCHARGE INSTRUCTIONS
Pelvic rest for 6 weeks (no sex, tampons, douching, nothing in the vagina)    You can experience vaginal bleeding on and off for up to 6 weeks, it will gradually get lighter and the color will change from bright red to a brownish discharge towards the end.    Activity:  NO strenuous activities or exercising.  Do not /lift anything over 15 pounds.   Do not do heavy housework or cleaning.    NO driving for 3 days.  You may take short car trips but do not drive.    You may shower and/or soak in a bathtub, both are acceptable.  Use a mild soap, no heavy perfumes or fragrances to avoid irritation.     If constipation develops:  You may take Colace (stool softener), Milk of Magnesia, Dulcolax or Miralax.  All of these medications are sold over the counter.      Care of Episiotomy:  Local agents such as Tucks pads & Dermoplast spray.  You may also use a Sitz bath: sitting in a tub of warm water for 15 minutes 2-3 times per day will help relieve the discomfort.      Pain Relief:  You may take Motrin for mild pain & uterine cramping.      Emotional Changes:  You may experience baby blues after delivery.  You may feel let down, anxious and cry easily.  This is normal.  These feelings can begin 2-3 days after delivery and usually disappear in about a week or two.  Prolonged sadness may indicate postpartum depression.      Call your doctor for any of the following:  Difficulty breathing, problems with any of your medications, inability to eat.    Foul smelling vaginal discharge.  Temperature above 100.4.    Heavy vaginal bleeding.  All women bleed different after delivery and each delivery is different.  Heavy bleeding consists of saturating a jocelin pad in a 1 hour time period.  Passing clots are normal, if you pass a blood clot larger than the size of a golf ball call your doctor's office.   If you experience pain in your legs/calves, if one leg increases in size and becomes swollen or becomes hot to touch or  discolored.   Crying or periods of sadness beyond 2 weeks.      If you are breast feeding:  Wash your breasts with mild soap and warm water.  You should wear a supportive bra.  You should continue to take a prenatal vitamin for 6 weeks or until breastfeeding is discontinued.  If nipples are sore, apply a few drops of breast milk after nursing and let air dry or you can use Lanolin cream.   If breasts are engorged, apply warmth and express milk.    If you are not breastfeeding:  Wear a tight bra and do not stimulate your breasts.  Avoid handling your breasts and do not express milk.  You may apply ice packs or cabbage leaves to relieve discomfort from engorgement.  If your breasts become warm to touch, reddened or lumps develop call your doctor.       Breastfeeding Discharge Instructions       [facility] Breastfeeding Support Services [phone]  AAP recommendation of exclusive breastfeeding for the first 6 months of life and continued breastfeeding with the introduction of supplemental foods beyond the first year of life.  Instructed on the recommendation to delay all bottle and pacifier use until after 4 weeks of age and breastfeeding is well established.  Discussed the benefits of exclusive breastfeeding for both mother and baby.  Discussed the risks of supplementation/pacifier use on the exclusivity of breastfeeding in the first 6 months. Feed the baby at the earliest sign of hunger or comfort  Hands to mouth, sucking motions  Rooting or searching for something to suck on  Dont wait for crying - it is a not a late sign of hunger; it is a sign of distress    The feedings may be 8-12 times per 24hrs and will not follow a schedule  Alternate the breast you start the feeding with, or start with the breast that feels the fullest  Switch breasts when the baby takes himself off the breast or falls asleep  Keep offering breasts until the baby looks full, no longer gives hunger signs, and stays asleep  when placed on his back in the crib  If the baby is sleepy and wont wake for a feeding, put the baby skin-to-skin dressed in a diaper against the mothers bare chest  Sleep near your baby  The baby should be positioned and latched on to the breast correctly  Chest-to-chest, chin in the breast  Babys lips are flipped outward  Babys mouth is stretched open wide like a shout  Babys sucking should feel like tugging to the mother  The baby should be drinking at the breast:  You should hear swallowing or gulping throughout the feeding  You should see milk on the babys lips when he comes off the breast  Your breasts should be softer when the baby is finished feeding  The baby should look relaxed at the end of feedings  After the 4th day and your milk is in:  The babys poop should turn bright yellow and be loose, watery, and seedy  The baby should have at least 3-4 poops the size of the palm of your hand per day  The baby should have at least 6-8 wet diapers per day  The urine should be light yellow in color  You should drink when you are thirsty and eat a healthy diet when you are    hungry.     Take naps to get the rest you need.   Take medications and/or drink alcohol only with permission of your obstetrician    or the babys pediatrician.  You can also call the [facility],   ([phone]), Monday-Friday, 8am-5pm Central time, to get the most   up-to-date evidence-based information on the use of medications during   pregnancy and breastfeeding.      The baby should be examined by a pediatrician at 3-5 days of age; unless ordered sooner by the pediatrician.  Once your milk comes in, the baby should be back to birth weight no later than 10-14 days of age.    If youre having problems with breastfeeding or have any questions regarding breastfeeding- call [facility] Breastfeeding Support services [phone]

## 2023-05-04 NOTE — PLAN OF CARE
Patient educated on bleeding , clots , when to call nurse ,  VSS, lochia wdl, voids without complication, + flatus ,pain controlled with prn pain medication  , continues to breastfeed, assistance offered prn

## 2023-05-04 NOTE — LACTATION NOTE
05/04/23 1335   Maternal Assessment   Breast Density Bilateral:;filling   Areola Bilateral:;elastic   Nipples Bilateral:;everted;short   Maternal Infant Feeding   Maternal Emotional State assist needed   Infant Positioning clutch/football   Signs of Milk Transfer audible swallow;breasts soften with feeding;infant jaw motion present   Pain with Feeding no   Comfort Measures Before/During Feeding infant position adjusted;latch adjusted;maternal position adjusted   Latch Assistance yes     Assisted to latch baby to left breast in football position. Baby latched deeply to nipple shield, nursing well with audible swallows. Transferred baby to bare breast after a few minutes of nursing with shield. Baby latched deeply to bare breast after several attempts, nursing well with audible swallows. Mother denies pain during feeding. Reviewed breastfeeding discharge instructions and engorgement precautions and encouraged to call lactation department for any breastfeeding related questions or concerns. Patient verbalizes understanding of all instructions with good recall.

## 2023-05-31 ENCOUNTER — LAB VISIT (OUTPATIENT)
Dept: LAB | Facility: HOSPITAL | Age: 18
End: 2023-05-31
Attending: OTOLARYNGOLOGY
Payer: COMMERCIAL

## 2023-05-31 DIAGNOSIS — J34.3 HYPERTROPHY OF NASAL TURBINATES: Primary | ICD-10-CM

## 2023-05-31 LAB
B-HCG UR QL: NEGATIVE
ERYTHROCYTE [DISTWIDTH] IN BLOOD BY AUTOMATED COUNT: 12 % (ref 11.5–14.5)
HCT VFR BLD AUTO: 40 % (ref 37–48.5)
HGB BLD-MCNC: 13.4 G/DL (ref 12–16)
MCH RBC QN AUTO: 28.7 PG (ref 27–31)
MCHC RBC AUTO-ENTMCNC: 33.5 G/DL (ref 32–36)
MCV RBC AUTO: 86 FL (ref 82–98)
PLATELET # BLD AUTO: 359 K/UL (ref 150–450)
PMV BLD AUTO: 10.1 FL (ref 9.2–12.9)
RBC # BLD AUTO: 4.67 M/UL (ref 4–5.4)
WBC # BLD AUTO: 8.03 K/UL (ref 3.9–12.7)

## 2023-05-31 PROCEDURE — 85027 COMPLETE CBC AUTOMATED: CPT | Performed by: OTOLARYNGOLOGY

## 2023-05-31 PROCEDURE — 81025 URINE PREGNANCY TEST: CPT | Performed by: OTOLARYNGOLOGY

## 2023-05-31 PROCEDURE — 36415 COLL VENOUS BLD VENIPUNCTURE: CPT | Performed by: OTOLARYNGOLOGY

## 2023-06-02 ENCOUNTER — HOSPITAL ENCOUNTER (OUTPATIENT)
Facility: HOSPITAL | Age: 18
Discharge: HOME | End: 2023-06-02
Attending: OTOLARYNGOLOGY | Admitting: OTOLARYNGOLOGY
Payer: COMMERCIAL

## 2023-06-02 ENCOUNTER — ANESTHESIA (OUTPATIENT)
Dept: SURGERY | Facility: HOSPITAL | Age: 18
End: 2023-06-02
Payer: COMMERCIAL

## 2023-06-02 ENCOUNTER — ANESTHESIA EVENT (OUTPATIENT)
Dept: SURGERY | Facility: HOSPITAL | Age: 18
End: 2023-06-02
Payer: COMMERCIAL

## 2023-06-02 VITALS
BODY MASS INDEX: 36.1 KG/M2 | SYSTOLIC BLOOD PRESSURE: 126 MMHG | OXYGEN SATURATION: 98 % | HEIGHT: 67 IN | RESPIRATION RATE: 10 BRPM | TEMPERATURE: 98 F | WEIGHT: 230 LBS | HEART RATE: 56 BPM | DIASTOLIC BLOOD PRESSURE: 81 MMHG

## 2023-06-02 DIAGNOSIS — R06.5 MOUTH BREATHING: ICD-10-CM

## 2023-06-02 DIAGNOSIS — J34.3 HYPERTROPHY OF NASAL TURBINATES: ICD-10-CM

## 2023-06-02 DIAGNOSIS — J98.8 UPPER RESPIRATORY TRACT OBSTRUCTION: ICD-10-CM

## 2023-06-02 DIAGNOSIS — R06.83 SNORING: ICD-10-CM

## 2023-06-02 DIAGNOSIS — J35.03 TONSILLITIS AND ADENOIDITIS, CHRONIC: Primary | ICD-10-CM

## 2023-06-02 PROCEDURE — D9220A PRA ANESTHESIA: ICD-10-PCS | Mod: CRNA,,, | Performed by: NURSE ANESTHETIST, CERTIFIED REGISTERED

## 2023-06-02 PROCEDURE — 36000707: Performed by: OTOLARYNGOLOGY

## 2023-06-02 PROCEDURE — 37000009 HC ANESTHESIA EA ADD 15 MINS: Performed by: OTOLARYNGOLOGY

## 2023-06-02 PROCEDURE — 63600175 PHARM REV CODE 636 W HCPCS

## 2023-06-02 PROCEDURE — 88304 TISSUE EXAM BY PATHOLOGIST: CPT | Performed by: PATHOLOGY

## 2023-06-02 PROCEDURE — 25000003 PHARM REV CODE 250: Performed by: OTOLARYNGOLOGY

## 2023-06-02 PROCEDURE — 71000039 HC RECOVERY, EACH ADD'L HOUR: Performed by: OTOLARYNGOLOGY

## 2023-06-02 PROCEDURE — 37000008 HC ANESTHESIA 1ST 15 MINUTES: Performed by: OTOLARYNGOLOGY

## 2023-06-02 PROCEDURE — D9220A PRA ANESTHESIA: ICD-10-PCS | Mod: ANES,,, | Performed by: ANESTHESIOLOGY

## 2023-06-02 PROCEDURE — 88304 PR  SURG PATH,LEVEL III: ICD-10-PCS | Mod: 26,,, | Performed by: PATHOLOGY

## 2023-06-02 PROCEDURE — 63600175 PHARM REV CODE 636 W HCPCS: Performed by: NURSE ANESTHETIST, CERTIFIED REGISTERED

## 2023-06-02 PROCEDURE — D9220A PRA ANESTHESIA: Mod: CRNA,,, | Performed by: NURSE ANESTHETIST, CERTIFIED REGISTERED

## 2023-06-02 PROCEDURE — 71000015 HC POSTOP RECOV 1ST HR: Performed by: OTOLARYNGOLOGY

## 2023-06-02 PROCEDURE — 27201423 OPTIME MED/SURG SUP & DEVICES STERILE SUPPLY: Performed by: OTOLARYNGOLOGY

## 2023-06-02 PROCEDURE — 71000016 HC POSTOP RECOV ADDL HR: Performed by: OTOLARYNGOLOGY

## 2023-06-02 PROCEDURE — 36000706: Performed by: OTOLARYNGOLOGY

## 2023-06-02 PROCEDURE — 25000003 PHARM REV CODE 250: Performed by: NURSE ANESTHETIST, CERTIFIED REGISTERED

## 2023-06-02 PROCEDURE — 88304 TISSUE EXAM BY PATHOLOGIST: CPT | Mod: 26,,, | Performed by: PATHOLOGY

## 2023-06-02 PROCEDURE — 71000033 HC RECOVERY, INTIAL HOUR: Performed by: OTOLARYNGOLOGY

## 2023-06-02 PROCEDURE — D9220A PRA ANESTHESIA: Mod: ANES,,, | Performed by: ANESTHESIOLOGY

## 2023-06-02 PROCEDURE — 63600175 PHARM REV CODE 636 W HCPCS: Performed by: ANESTHESIOLOGY

## 2023-06-02 PROCEDURE — 25000003 PHARM REV CODE 250: Performed by: ANESTHESIOLOGY

## 2023-06-02 RX ORDER — DEXAMETHASONE SODIUM PHOSPHATE 4 MG/ML
INJECTION, SOLUTION INTRA-ARTICULAR; INTRALESIONAL; INTRAMUSCULAR; INTRAVENOUS; SOFT TISSUE
Status: DISCONTINUED | OUTPATIENT
Start: 2023-06-02 | End: 2023-06-02

## 2023-06-02 RX ORDER — ONDANSETRON 2 MG/ML
4 INJECTION INTRAMUSCULAR; INTRAVENOUS DAILY PRN
Status: DISCONTINUED | OUTPATIENT
Start: 2023-06-02 | End: 2023-06-02 | Stop reason: HOSPADM

## 2023-06-02 RX ORDER — SODIUM CHLORIDE, SODIUM LACTATE, POTASSIUM CHLORIDE, CALCIUM CHLORIDE 600; 310; 30; 20 MG/100ML; MG/100ML; MG/100ML; MG/100ML
125 INJECTION, SOLUTION INTRAVENOUS CONTINUOUS
Status: CANCELLED | OUTPATIENT
Start: 2023-06-02

## 2023-06-02 RX ORDER — LIDOCAINE HYDROCHLORIDE 20 MG/ML
INJECTION, SOLUTION EPIDURAL; INFILTRATION; INTRACAUDAL; PERINEURAL
Status: DISCONTINUED | OUTPATIENT
Start: 2023-06-02 | End: 2023-06-02

## 2023-06-02 RX ORDER — MORPHINE SULFATE 2 MG/ML
2 INJECTION, SOLUTION INTRAMUSCULAR; INTRAVENOUS EVERY 5 MIN PRN
Status: DISCONTINUED | OUTPATIENT
Start: 2023-06-02 | End: 2023-06-02 | Stop reason: HOSPADM

## 2023-06-02 RX ORDER — SODIUM CHLORIDE, SODIUM LACTATE, POTASSIUM CHLORIDE, CALCIUM CHLORIDE 600; 310; 30; 20 MG/100ML; MG/100ML; MG/100ML; MG/100ML
INJECTION, SOLUTION INTRAVENOUS CONTINUOUS
Status: CANCELLED | OUTPATIENT
Start: 2023-06-02

## 2023-06-02 RX ORDER — MORPHINE SULFATE 2 MG/ML
INJECTION, SOLUTION INTRAMUSCULAR; INTRAVENOUS
Status: COMPLETED
Start: 2023-06-02 | End: 2023-06-02

## 2023-06-02 RX ORDER — KETOROLAC TROMETHAMINE 30 MG/ML
15 INJECTION, SOLUTION INTRAMUSCULAR; INTRAVENOUS ONCE
Status: COMPLETED | OUTPATIENT
Start: 2023-06-02 | End: 2023-06-02

## 2023-06-02 RX ORDER — SUCCINYLCHOLINE CHLORIDE 20 MG/ML
INJECTION INTRAMUSCULAR; INTRAVENOUS
Status: DISCONTINUED | OUTPATIENT
Start: 2023-06-02 | End: 2023-06-02

## 2023-06-02 RX ORDER — ACETAMINOPHEN 10 MG/ML
INJECTION, SOLUTION INTRAVENOUS
Status: DISCONTINUED | OUTPATIENT
Start: 2023-06-02 | End: 2023-06-02

## 2023-06-02 RX ORDER — FENTANYL CITRATE 50 UG/ML
INJECTION, SOLUTION INTRAMUSCULAR; INTRAVENOUS
Status: DISCONTINUED | OUTPATIENT
Start: 2023-06-02 | End: 2023-06-02

## 2023-06-02 RX ORDER — ONDANSETRON 2 MG/ML
INJECTION INTRAMUSCULAR; INTRAVENOUS
Status: DISCONTINUED | OUTPATIENT
Start: 2023-06-02 | End: 2023-06-02

## 2023-06-02 RX ORDER — SODIUM CHLORIDE, SODIUM LACTATE, POTASSIUM CHLORIDE, CALCIUM CHLORIDE 600; 310; 30; 20 MG/100ML; MG/100ML; MG/100ML; MG/100ML
125 INJECTION, SOLUTION INTRAVENOUS CONTINUOUS
Status: DISCONTINUED | OUTPATIENT
Start: 2023-06-02 | End: 2023-06-02 | Stop reason: HOSPADM

## 2023-06-02 RX ORDER — BUPIVACAINE HYDROCHLORIDE 5 MG/ML
INJECTION, SOLUTION EPIDURAL; INTRACAUDAL
Status: DISCONTINUED | OUTPATIENT
Start: 2023-06-02 | End: 2023-06-02 | Stop reason: HOSPADM

## 2023-06-02 RX ORDER — OXYMETAZOLINE HCL 0.05 %
SPRAY, NON-AEROSOL (ML) NASAL
Status: DISCONTINUED | OUTPATIENT
Start: 2023-06-02 | End: 2023-06-02 | Stop reason: HOSPADM

## 2023-06-02 RX ORDER — PROPOFOL 10 MG/ML
VIAL (ML) INTRAVENOUS
Status: DISCONTINUED | OUTPATIENT
Start: 2023-06-02 | End: 2023-06-02

## 2023-06-02 RX ORDER — LIDOCAINE HYDROCHLORIDE 10 MG/ML
1 INJECTION, SOLUTION EPIDURAL; INFILTRATION; INTRACAUDAL; PERINEURAL ONCE
Status: CANCELLED | OUTPATIENT
Start: 2023-06-02 | End: 2023-06-02

## 2023-06-02 RX ORDER — LIDOCAINE HYDROCHLORIDE AND EPINEPHRINE 20; 10 MG/ML; UG/ML
INJECTION, SOLUTION INFILTRATION; PERINEURAL
Status: DISCONTINUED | OUTPATIENT
Start: 2023-06-02 | End: 2023-06-02 | Stop reason: HOSPADM

## 2023-06-02 RX ORDER — OXYCODONE AND ACETAMINOPHEN 5; 325 MG/1; MG/1
1 TABLET ORAL
Status: DISCONTINUED | OUTPATIENT
Start: 2023-06-02 | End: 2023-06-02 | Stop reason: HOSPADM

## 2023-06-02 RX ORDER — ROCURONIUM BROMIDE 10 MG/ML
INJECTION, SOLUTION INTRAVENOUS
Status: DISCONTINUED | OUTPATIENT
Start: 2023-06-02 | End: 2023-06-02

## 2023-06-02 RX ADMIN — ROCURONIUM BROMIDE 10 MG: 10 INJECTION, SOLUTION INTRAVENOUS at 09:06

## 2023-06-02 RX ADMIN — SUCCINYLCHOLINE CHLORIDE 100 MG: 20 INJECTION, SOLUTION INTRAMUSCULAR; INTRAVENOUS at 09:06

## 2023-06-02 RX ADMIN — ACETAMINOPHEN 1000 MG: 10 INJECTION, SOLUTION INTRAVENOUS at 10:06

## 2023-06-02 RX ADMIN — MORPHINE SULFATE 2 MG: 2 INJECTION, SOLUTION INTRAMUSCULAR; INTRAVENOUS at 10:06

## 2023-06-02 RX ADMIN — PROPOFOL 200 MG: 10 INJECTION, EMULSION INTRAVENOUS at 09:06

## 2023-06-02 RX ADMIN — SUGAMMADEX 200 MG: 100 INJECTION, SOLUTION INTRAVENOUS at 10:06

## 2023-06-02 RX ADMIN — ONDANSETRON 4 MG: 2 INJECTION INTRAMUSCULAR; INTRAVENOUS at 10:06

## 2023-06-02 RX ADMIN — LIDOCAINE HYDROCHLORIDE 100 MG: 20 INJECTION, SOLUTION EPIDURAL; INFILTRATION; INTRACAUDAL; PERINEURAL at 09:06

## 2023-06-02 RX ADMIN — FENTANYL CITRATE 100 MCG: 50 INJECTION, SOLUTION INTRAMUSCULAR; INTRAVENOUS at 09:06

## 2023-06-02 RX ADMIN — OXYCODONE HYDROCHLORIDE AND ACETAMINOPHEN 1 TABLET: 5; 325 TABLET ORAL at 11:06

## 2023-06-02 RX ADMIN — KETOROLAC TROMETHAMINE 15 MG: 30 INJECTION, SOLUTION INTRAMUSCULAR at 11:06

## 2023-06-02 RX ADMIN — DEXAMETHASONE SODIUM PHOSPHATE 4 MG: 4 INJECTION, SOLUTION INTRAMUSCULAR; INTRAVENOUS at 10:06

## 2023-06-02 NOTE — OP NOTE
[place] - Surgery  Operative Note     SUMMARY     Surgery Date: 6/2/2023       Pre-op Diagnosis:  Hypertrophy of nasal turbinates [J34.3]  Upper respiratory tract obstruction [J98.8]  Tonsillitis and adenoiditis, chronic [J35.03]  Snoring [R06.83]  Mouth breathing [R06.5]    Post-op Diagnosis:  Post-Op Diagnosis Codes:     * Hypertrophy of nasal turbinates [J34.3]     * Upper respiratory tract obstruction [J98.8]     * Tonsillitis and adenoiditis, chronic [J35.03]     * Snoring [R06.83]     * Mouth breathing [R06.5]    Procedure(s) (LRB):  EXCISION, NASAL TURBINATE, SUBMUCOSAL (Bilateral)  TONSILLECTOMY AND ADENOIDECTOMY (Bilateral)    Surgeon(s) and Role:     * Víctor Costa MD - Primary      Estimated Blood Loss: * No values recorded between 6/2/2023  9:57 AM and 6/2/2023 10:25 AM *    Anesthesia:  General    Description of the findings of the procedure:  Hypertrophy of nasal turbinates [J34.3]  Upper respiratory tract obstruction [J98.8]  Tonsillitis and adenoiditis, chronic [J35.03]  Snoring [R06.83]  Mouth breathing [R06.5]           Procedure: [place] - Surgery  Operative Note    OP Note      Date of Procedure: 6/2/2023       Pre-Operative Diagnosis:   Hypertrophy of nasal turbinates [J34.3]  Upper respiratory tract obstruction [J98.8]  Tonsillitis and adenoiditis, chronic [J35.03]  Snoring [R06.83]  Mouth breathing [R06.5]    Post-Operative Diagnosis:   Post-Op Diagnosis Codes:     * Hypertrophy of nasal turbinates [J34.3]     * Upper respiratory tract obstruction [J98.8]     * Tonsillitis and adenoiditis, chronic [J35.03]     * Snoring [R06.83]     * Mouth breathing [R06.5]    Anesthesia: General    Procedures performed:   EXCISION, NASAL TURBINATE, SUBMUCOSAL:   TONSILLECTOMY AND ADENOIDECTOMY:     Surgeon: Víctor Costa MD    Procedure In Detail:   The patient was taken to the operating room and placed in the supine position. After satisfactory general endotracheal anesthesia had been obtained,  the procedure was begun. A McIvor mouth gag was placed into the patient's mouth and opened. The distal end was attached to a Gordon stand. A throat pack was placed into the hypopharynx. A red rubber catheter was placed into the patient's nose and brought out through the mouth and attached just superior to the upper lip. Using a mirror, the nasopharynx and adenoids were visualized. Using a Bovie cautery, the adenoids were cauterized reducing the mass of adenoids markedly. Hemostasis was obtained.    Attention was then turned to the tonsillectomy. Both tonsillar areas were injected with lidocaine with epinephrine and Marcaine. Attention was then turned to the left tonsil. The superior pole of the left tonsil was grasped and pulled medially. A #12 blade was taken and the superior pole mucosa incised. Metzenbaum scissors was used to dissect down and delineate the superior pole of the tonsil. The superior pole was then disected from the lateral muscular wall. The disection was then carried down in the plane between the tonsillar capsule and the muscular wall of the tonsillar fossa using a Mejia blunt dissector. This was done until the inferior pole was reached. A snare was taken and used to snare the inferior pole of the tonsil. The tonsil was removed. A tonsillar pack was placed into the left tonsillar fossa.     Attention was then turned to the right tonsil. The superior pole was grasped and pulled medially. A #12 blade was taken and the superior pole mucosa was incised. The superior pole was then disected from the lateral tonsillar wall using Metzenbaum scissors.  The was carried down in the plane between the tonsillar capsule and the lateral muscular wall of the tonsillar fossa using a blunt Mejia knife. The inferior pole was then snared and a tonsillar pack placed into the right tonsillar fossa. The packs were sequentially removed.     Hemostasis was then obtained in the left tonsillar fossa area, followed by the  right tonsillar fossa area. The nasopharynx was viewed, and there was no bleeding. There was no bleeding of either tonsillar fossa. The throat pack was removed, followed by the McIvor mouth gag.     Attention was turned to the patient's nose. The left and right inferior turbinates were then viewed. They were both hypertrophic producing nasal airway obstruction. The anterior tips of each turbinate were then injected with lidocaine 1% with 1:100,000 epinephrine, approximately 3 mL was used in each turbinate. This was injected over the anterior 2 cm. A micro-debrider was then taken and used to perry the anterior tip of both the left and right inferior turbinate. This was carried back, while being activated, 2 cm along the medial and inferior border of the left and right inferior turbinate. This was done until substantial volume reduction had been accomplished. After removing the micro-debrider from each turbinate, the turbinate was viewed and found to be markedly reduced in size. Hemostasis was obtained. The oxymetazxoline pledgets were replaced.  The patient was awakened and taken to the recovery room in stable condition.

## 2023-06-02 NOTE — ANESTHESIA PREPROCEDURE EVALUATION
06/02/2023  Tatiana Cross is a 17 y.o., female.      Pre-op Assessment    I have reviewed the Patient Summary Reports.     I have reviewed the Nursing Notes.    I have reviewed the Medications.     Review of Systems  Anesthesia Hx:  No problems with previous Anesthesia  Neg history of prior surgery. Denies Family Hx of Anesthesia complications.   Denies Personal Hx of Anesthesia complications.   Social:  Former Smoker    Hematology/Oncology:  Hematology Normal   Oncology Normal     EENT/Dental:EENT/Dental Normal   Cardiovascular:  Cardiovascular Normal     Pulmonary:   Asthma asymptomatic    Renal/:  Renal/ Normal     Hepatic/GI:  Hepatic/GI Normal    Musculoskeletal:  Musculoskeletal Normal    Neurological:  Neurology Normal    Endocrine:  Endocrine Normal    Dermatological:  Skin Normal    Psych:   Psychiatric History depression          Physical Exam  General: Alert    Airway:  Mallampati: II   Mouth Opening: Normal  TM Distance: Normal  Neck ROM: Normal ROM    Dental:  Intact    Chest/Lungs:  Clear to auscultation, Normal Respiratory Rate    Heart:  Rate: Normal    Abdomen:  Normal        Anesthesia Plan  Type of Anesthesia, risks & benefits discussed:    Anesthesia Type: Gen ETT  Post Op Pain Control Plan: multimodal analgesia  Airway Plan: Direct, Post-Induction  Informed Consent: Informed consent signed with the Patient and all parties understand the risks and agree with anesthesia plan.  All questions answered. Patient consented to blood products? No  ASA Score: 2  Day of Surgery Review of History & Physical: H&P Update referred to the surgeon/provider.    Ready For Surgery From Anesthesia Perspective.     .

## 2023-06-02 NOTE — BRIEF OP NOTE
[facility], [place] Surgery  Brief Operative Note     SUMMARY     Surgery Date: 6/2/2023     Surgeon(s) and Role:     * Víctor Costa MD - Primary        Pre-op Diagnosis:  Hypertrophy of nasal turbinates [J34.3]  Upper respiratory tract obstruction [J98.8]  Tonsillitis and adenoiditis, chronic [J35.03]  Snoring [R06.83]  Mouth breathing [R06.5]    Post-op Diagnosis:  Post-Op Diagnosis Codes:     * Hypertrophy of nasal turbinates [J34.3]     * Upper respiratory tract obstruction [J98.8]     * Tonsillitis and adenoiditis, chronic [J35.03]     * Snoring [R06.83]     * Mouth breathing [R06.5]    Procedure(s) (LRB):  EXCISION, NASAL TURBINATE, SUBMUCOSAL (Bilateral)  TONSILLECTOMY AND ADENOIDECTOMY (Bilateral)      Description of the findings of the procedure:  Hypertrophy of nasal turbinates [J34.3]  Upper respiratory tract obstruction [J98.8]  Tonsillitis and adenoiditis, chronic [J35.03]  Snoring [R06.83]  Mouth breathing [R06.5]      Estimated Blood Loss: * No values recorded between 6/2/2023  9:57 AM and 6/2/2023 10:25 AM *[facility] [place] Surgery  Brief Operative Note     SUMMARY     Surgery Date: 6/2/2023     Surgeon(s) and Role:     * Víctor Costa MD - Primary        Pre-op Diagnosis:  Hypertrophy of nasal turbinates [J34.3]  Upper respiratory tract obstruction [J98.8]  Tonsillitis and adenoiditis, chronic [J35.03]  Snoring [R06.83]  Mouth breathing [R06.5]    Post-op Diagnosis:  Post-Op Diagnosis Codes:     * Hypertrophy of nasal turbinates [J34.3]     * Upper respiratory tract obstruction [J98.8]     * Tonsillitis and adenoiditis, chronic [J35.03]     * Snoring [R06.83]     * Mouth breathing [R06.5]    Procedure(s) (LRB):  EXCISION, NASAL TURBINATE, SUBMUCOSAL (Bilateral)  TONSILLECTOMY AND ADENOIDECTOMY (Bilateral)      Description of the findings of the procedure:  Hypertrophy of nasal turbinates [J34.3]  Upper respiratory tract obstruction [J98.8]  Tonsillitis and adenoiditis, chronic [J35.03]  Snoring  [R06.83]  Mouth breathing [R06.5]      Estimated Blood Loss: * No values recorded between 6/2/2023  9:57 AM and 6/2/2023 10:26 AM *[facility] Surgery  Brief Operative Note     SUMMARY     Surgery Date: 6/2/2023     Surgeon(s) and Role:     * Víctor Costa MD - Primary        Pre-op Diagnosis:  Hypertrophy of nasal turbinates [J34.3]  Upper respiratory tract obstruction [J98.8]  Tonsillitis and adenoiditis, chronic [J35.03]  Snoring [R06.83]  Mouth breathing [R06.5]    Post-op Diagnosis:  Post-Op Diagnosis Codes:     * Hypertrophy of nasal turbinates [J34.3]     * Upper respiratory tract obstruction [J98.8]     * Tonsillitis and adenoiditis, chronic [J35.03]     * Snoring [R06.83]     * Mouth breathing [R06.5]    Procedure(s) (LRB):  EXCISION, NASAL TURBINATE, SUBMUCOSAL (Bilateral)  TONSILLECTOMY AND ADENOIDECTOMY (Bilateral)      Description of the findings of the procedure:  Hypertrophy of nasal turbinates [J34.3]  Upper respiratory tract obstruction [J98.8]  Tonsillitis and adenoiditis, chronic [J35.03]  Snoring [R06.83]  Mouth breathing [R06.5]      Estimated Blood Loss: * No values recorded between 6/2/2023  9:57 AM and 6/2/2023 10:26 AM *[facility]  Brief Operative Note     SUMMARY     Surgery Date: 6/2/2023     Surgeon(s) and Role:     * Víctor Costa MD - Primary        Pre-op Diagnosis:  Hypertrophy of nasal turbinates [J34.3]  Upper respiratory tract obstruction [J98.8]  Tonsillitis and adenoiditis, chronic [J35.03]  Snoring [R06.83]  Mouth breathing [R06.5]    Post-op Diagnosis:  Post-Op Diagnosis Codes:     * Hypertrophy of nasal turbinates [J34.3]     * Upper respiratory tract obstruction [J98.8]     * Tonsillitis and adenoiditis, chronic [J35.03]     * Snoring [R06.83]     * Mouth breathing [R06.5]    Procedure(s) (LRB):  EXCISION, NASAL TURBINATE, SUBMUCOSAL (Bilateral)  TONSILLECTOMY AND ADENOIDECTOMY (Bilateral)      Description of the findings of the procedure:  Hypertrophy of nasal  turbinates [J34.3]  Upper respiratory tract obstruction [J98.8]  Tonsillitis and adenoiditis, chronic [J35.03]  Snoring [R06.83]  Mouth breathing [R06.5]      Estimated Blood Loss: * No values recorded between 6/2/2023  9:57 AM and 6/2/2023 10:25 AM *

## 2023-06-02 NOTE — TRANSFER OF CARE
"Anesthesia Transfer of Care Note    Patient: Tatiana Cross    Procedure(s) Performed: Procedure(s) (LRB):  EXCISION, NASAL TURBINATE, SUBMUCOSAL (Bilateral)  TONSILLECTOMY AND ADENOIDECTOMY (Bilateral)    Patient location: PACU    Anesthesia Type: general    Transport from OR: Transported from OR on room air with adequate spontaneous ventilation    Post pain: adequate analgesia    Post assessment: no apparent anesthetic complications    Post vital signs: stable    Level of consciousness: awake    Nausea/Vomiting: no nausea/vomiting    Complications: none    Transfer of care protocol was followed      Last vitals:   Visit Vitals  /63   Pulse (!) 55   Temp 36.8 °C (98.2 °F)   Resp 19   Ht 5' 7" (1.702 m)   Wt 104.3 kg (230 lb)   LMP 08/12/2022 (Exact Date)   SpO2 97%   Breastfeeding Yes   BMI 36.02 kg/m²     "

## 2023-06-02 NOTE — ANESTHESIA POSTPROCEDURE EVALUATION
Anesthesia Post Evaluation    Patient: Tatiana Cross    Procedure(s) Performed: Procedure(s) (LRB):  EXCISION, NASAL TURBINATE, SUBMUCOSAL (Bilateral)  TONSILLECTOMY AND ADENOIDECTOMY (Bilateral)    Final Anesthesia Type: general      Patient location during evaluation: PACU  Patient participation: Yes- Able to Participate  Level of consciousness: awake and awake and alert  Post-procedure vital signs: reviewed and stable  Pain management: adequate  Airway patency: patent    PONV status at discharge: No PONV  Anesthetic complications: no      Cardiovascular status: blood pressure returned to baseline  Respiratory status: unassisted and spontaneous ventilation  Hydration status: euvolemic  Follow-up not needed.          Vitals Value Taken Time   /90 06/02/23 1055   Temp 36.5 °C (97.7 °F) 06/02/23 1030   Pulse 66 06/02/23 1056   Resp 20 06/02/23 1056   SpO2 99 % 06/02/23 1056   Vitals shown include unvalidated device data.      No case tracking events are documented in the log.      Pain/Luis Score: Pain Rating Prior to Med Admin: 5 (6/2/2023 10:43 AM)  Luis Score: 10 (6/2/2023 10:27 AM)

## 2023-06-02 NOTE — DISCHARGE SUMMARY
[place] - Surgery    Discharge Note        SUMMARY     Admit Date: 6/2/2023    Attending Physician: Víctor Costa MD     Discharge Physician: Víctor Costa MD    Discharge Date: 6/2/2023 10:27 AM      Hospital Course: Patient tolerated procedure well.     Disposition: Home or Self Care    Patient Instructions:   Current Discharge Medication List        CONTINUE these medications which have NOT CHANGED    Details   albuterol (PROVENTIL/VENTOLIN HFA) 90 mcg/actuation inhaler Inhale 2 puffs into the lungs every 4 (four) hours as needed.      FLUoxetine 20 MG capsule Take 20 mg by mouth once daily.      PNV cmb#95-ferrous fumarate-FA (PRENATAL) 28 mg iron- 800 mcg Tab Take 1 tablet by mouth once daily.      valACYclovir (VALTREX) 500 MG tablet Take 500 mg by mouth.      VYVANSE 50 mg capsule Take 50 mg by mouth every morning.             Discharge Procedure Orders (must include Diet, Follow-up, Activity):  No discharge procedures on file.     Follow Up:  Follow up as scheduled.  Resume routine diet.  Activity as tolerated.

## 2023-06-02 NOTE — ADDENDUM NOTE
Addendum  created 06/02/23 1107 by Leoncio Woodson MD    Order list changed, Pharmacy for encounter modified

## 2023-06-04 ENCOUNTER — HOSPITAL ENCOUNTER (EMERGENCY)
Facility: HOSPITAL | Age: 18
Discharge: HOME | End: 2023-06-04
Attending: EMERGENCY MEDICINE
Payer: COMMERCIAL

## 2023-06-04 VITALS
HEART RATE: 102 BPM | DIASTOLIC BLOOD PRESSURE: 75 MMHG | BODY MASS INDEX: 36.1 KG/M2 | SYSTOLIC BLOOD PRESSURE: 103 MMHG | WEIGHT: 230 LBS | RESPIRATION RATE: 18 BRPM | TEMPERATURE: 100 F | OXYGEN SATURATION: 96 % | HEIGHT: 67 IN

## 2023-06-04 DIAGNOSIS — J02.0 STREP PHARYNGITIS: Primary | ICD-10-CM

## 2023-06-04 LAB
ALBUMIN SERPL BCP-MCNC: 3.5 G/DL (ref 3.5–5.2)
ALP SERPL-CCNC: 90 U/L (ref 55–135)
ALT SERPL W/O P-5'-P-CCNC: 17 U/L (ref 10–44)
ANION GAP SERPL CALC-SCNC: 14 MMOL/L (ref 8–16)
AST SERPL-CCNC: 17 U/L (ref 10–40)
BASOPHILS # BLD AUTO: 0.03 K/UL (ref 0–0.2)
BASOPHILS NFR BLD: 0.1 % (ref 0–1.9)
BILIRUB SERPL-MCNC: 0.6 MG/DL (ref 0.1–1)
BUN SERPL-MCNC: 11 MG/DL (ref 6–20)
CALCIUM SERPL-MCNC: 9.5 MG/DL (ref 8.7–10.5)
CHLORIDE SERPL-SCNC: 105 MMOL/L (ref 95–110)
CO2 SERPL-SCNC: 19 MMOL/L (ref 23–29)
CREAT SERPL-MCNC: 0.8 MG/DL (ref 0.5–1.4)
DIFFERENTIAL METHOD: ABNORMAL
EOSINOPHIL # BLD AUTO: 0 K/UL (ref 0–0.5)
EOSINOPHIL NFR BLD: 0 % (ref 0–8)
ERYTHROCYTE [DISTWIDTH] IN BLOOD BY AUTOMATED COUNT: 12.5 % (ref 11.5–14.5)
EST. GFR  (NO RACE VARIABLE): >60 ML/MIN/1.73 M^2
GLUCOSE SERPL-MCNC: 99 MG/DL (ref 70–110)
GROUP A STREP, MOLECULAR: POSITIVE
HCT VFR BLD AUTO: 36.6 % (ref 37–48.5)
HETEROPH AB BLD QL IA: NEGATIVE
HGB BLD-MCNC: 12.3 G/DL (ref 12–16)
IMM GRANULOCYTES # BLD AUTO: 0.13 K/UL (ref 0–0.04)
IMM GRANULOCYTES NFR BLD AUTO: 0.6 % (ref 0–0.5)
LYMPHOCYTES # BLD AUTO: 1.2 K/UL (ref 1–4.8)
LYMPHOCYTES NFR BLD: 5.9 % (ref 18–48)
MCH RBC QN AUTO: 28.8 PG (ref 27–31)
MCHC RBC AUTO-ENTMCNC: 33.6 G/DL (ref 32–36)
MCV RBC AUTO: 86 FL (ref 82–98)
MONOCYTES # BLD AUTO: 0.8 K/UL (ref 0.3–1)
MONOCYTES NFR BLD: 4 % (ref 4–15)
NEUTROPHILS # BLD AUTO: 18.4 K/UL (ref 1.8–7.7)
NEUTROPHILS NFR BLD: 89.4 % (ref 38–73)
NRBC BLD-RTO: 0 /100 WBC
PLATELET # BLD AUTO: 257 K/UL (ref 150–450)
PMV BLD AUTO: 10.4 FL (ref 9.2–12.9)
POTASSIUM SERPL-SCNC: 3.4 MMOL/L (ref 3.5–5.1)
PROT SERPL-MCNC: 7.2 G/DL (ref 6–8.4)
RBC # BLD AUTO: 4.27 M/UL (ref 4–5.4)
SARS-COV-2 RDRP RESP QL NAA+PROBE: NEGATIVE
SODIUM SERPL-SCNC: 138 MMOL/L (ref 136–145)
WBC # BLD AUTO: 20.6 K/UL (ref 3.9–12.7)

## 2023-06-04 PROCEDURE — 85025 COMPLETE CBC W/AUTO DIFF WBC: CPT | Performed by: EMERGENCY MEDICINE

## 2023-06-04 PROCEDURE — U0002 COVID-19 LAB TEST NON-CDC: HCPCS | Performed by: EMERGENCY MEDICINE

## 2023-06-04 PROCEDURE — 99284 EMERGENCY DEPT VISIT MOD MDM: CPT

## 2023-06-04 PROCEDURE — 25000003 PHARM REV CODE 250: Performed by: EMERGENCY MEDICINE

## 2023-06-04 PROCEDURE — 96372 THER/PROPH/DIAG INJ SC/IM: CPT | Performed by: EMERGENCY MEDICINE

## 2023-06-04 PROCEDURE — 87651 STREP A DNA AMP PROBE: CPT | Performed by: EMERGENCY MEDICINE

## 2023-06-04 PROCEDURE — 86308 HETEROPHILE ANTIBODY SCREEN: CPT | Performed by: EMERGENCY MEDICINE

## 2023-06-04 PROCEDURE — 80053 COMPREHEN METABOLIC PANEL: CPT | Performed by: EMERGENCY MEDICINE

## 2023-06-04 PROCEDURE — 63600175 PHARM REV CODE 636 W HCPCS: Mod: JZ,JG | Performed by: EMERGENCY MEDICINE

## 2023-06-04 RX ORDER — IBUPROFEN 400 MG/1
400 TABLET ORAL
Status: COMPLETED | OUTPATIENT
Start: 2023-06-04 | End: 2023-06-04

## 2023-06-04 RX ORDER — HYDROCODONE BITARTRATE AND ACETAMINOPHEN 10; 325 MG/1; MG/1
1 TABLET ORAL
Status: COMPLETED | OUTPATIENT
Start: 2023-06-04 | End: 2023-06-04

## 2023-06-04 RX ADMIN — IBUPROFEN 400 MG: 400 TABLET, FILM COATED ORAL at 03:06

## 2023-06-04 RX ADMIN — HYDROCODONE BITARTRATE AND ACETAMINOPHEN 1 TABLET: 10; 325 TABLET ORAL at 03:06

## 2023-06-04 RX ADMIN — PENICILLIN G BENZATHINE 1.2 MILLION UNITS: 1200000 INJECTION, SUSPENSION INTRAMUSCULAR at 03:06

## 2023-06-04 NOTE — DISCHARGE INSTRUCTIONS
Continue your previously prescribed medications and treatments, and contact Dr. Cosat tomorrow morning for further advice.  Return here for any worsening signs or symptoms.

## 2023-06-04 NOTE — ED PROVIDER NOTES
Encounter Date: 6/4/2023       History     Chief Complaint   Patient presents with    Fever     Pt.s Mother states the pt. Has had a fever x 1.5 hours. States the pt. Had a tonsillectomy Friday. States surgery was completed by Dr. Costa.     17-year-old female, here from home, brought by mother, for evaluation of fever for the last few hours.  Mother states fever got as high as 105 ° at home.  Mother gave Tylenol prior to coming here.  Temperature here is 102.8.  Patient had a tonsillectomy with Dr. Costa on Friday, 2 days ago.  She is been doing well at home until now.  She is been taking prescribed pain medication as well.  Patient denies any nasal congestion, ear pain, cough, rash, difficulty breathing, nausea, vomiting, loose stools, dysuria, or hematuria.  Patient is able to swallow and handling her secretions well.  She is taking her prescribed pain medications and prescribed antibiotics without difficulty.  Mother and patient did not remember the name of the antibiotic.    Review of patient's allergies indicates:  No Known Allergies  Past Medical History:   Diagnosis Date    Anxiety     Asthma     Depression     Herpes simplex virus (HSV) infection      Past Surgical History:   Procedure Laterality Date    COLONOSCOPY N/A 05/27/2020    Procedure: COLONOSCOPY;  Surgeon: Bunny Eaton MD;  Location: [facility];  Service: Endoscopy;  Laterality: N/A;    COLONOSCOPY       Family History   Problem Relation Age of Onset    Thyroid disease Mother     Heart disease Paternal Grandmother      Social History     Tobacco Use    Smoking status: Some Days     Types: Vaping with nicotine    Smokeless tobacco: Never   Substance Use Topics    Alcohol use: Not Currently    Drug use: Never     Review of Systems   Constitutional:  Positive for chills and fever.   HENT:  Positive for sore throat.    Eyes: Negative.    Respiratory: Negative.     Cardiovascular: Negative.    Gastrointestinal: Negative.    Endocrine: Negative.     Genitourinary: Negative.    Musculoskeletal: Negative.    Skin: Negative.    Allergic/Immunologic: Negative.    Neurological: Negative.    Hematological: Negative.    Psychiatric/Behavioral: Negative.       Physical Exam     Initial Vitals [06/04/23 0232]   BP Pulse Resp Temp SpO2   107/60 (!) 116 20 (!) 102.8 °F (39.3 °C) 98 %      MAP       --         Physical Exam    Nursing note and vitals reviewed.  Constitutional: She appears well-developed and well-nourished. She is not diaphoretic. No distress.   HENT:   Head: Normocephalic and atraumatic.   Right Ear: External ear normal.   Left Ear: External ear normal.   Nose: Nose normal.   Mouth/Throat: Oropharynx is clear and moist. No oropharyngeal exudate.   Posterior oropharynx is mildly erythematous.  Scars from recent tonsillectomy are in place.  No bleeding.  Airway patent.   Eyes: Conjunctivae and EOM are normal. Pupils are equal, round, and reactive to light. No scleral icterus.   Neck: Neck supple. No JVD present.   Normal range of motion.  Cardiovascular:  Normal rate, regular rhythm, normal heart sounds and intact distal pulses.           No murmur heard.  Pulmonary/Chest: Breath sounds normal. No stridor. No respiratory distress. She has no wheezes. She has no rales.   Abdominal: Abdomen is soft. Bowel sounds are normal. She exhibits no distension. There is no abdominal tenderness.   Musculoskeletal:         General: No tenderness or edema. Normal range of motion.      Cervical back: Normal range of motion and neck supple.     Neurological: She is alert and oriented to person, place, and time. She has normal strength. No cranial nerve deficit or sensory deficit. GCS score is 15. GCS eye subscore is 4. GCS verbal subscore is 5. GCS motor subscore is 6.   Skin: Skin is warm and dry. Capillary refill takes less than 2 seconds. No rash noted. No erythema.   Psychiatric: She has a normal mood and affect. Her behavior is normal.       ED Course    Procedures  Labs Reviewed   GROUP A STREP, MOLECULAR - Abnormal; Notable for the following components:       Result Value    Group A Strep, Molecular Positive (*)     All other components within normal limits   CBC W/ AUTO DIFFERENTIAL - Abnormal; Notable for the following components:    WBC 20.60 (*)     Hematocrit 36.6 (*)     Immature Granulocytes 0.6 (*)     Gran # (ANC) 18.4 (*)     Immature Grans (Abs) 0.13 (*)     Gran % 89.4 (*)     Lymph % 5.9 (*)     All other components within normal limits   COMPREHENSIVE METABOLIC PANEL - Abnormal; Notable for the following components:    Potassium 3.4 (*)     CO2 19 (*)     All other components within normal limits   SARS-COV-2 RNA AMPLIFICATION, QUAL    Narrative:     Is the patient symptomatic?->No   HETEROPHILE AB SCREEN   URINALYSIS, REFLEX TO URINE CULTURE   PREGNANCY TEST, URINE RAPID          Imaging Results    None          Medications   ibuprofen tablet 400 mg (400 mg Oral Given 6/4/23 0307)   HYDROcodone-acetaminophen  mg per tablet 1 tablet (1 tablet Oral Given 6/4/23 0354)   penicillin G benzathine (BICILLIN LA) injection 1.2 Million Units (1.2 Million Units Intramuscular Given 6/4/23 0353)     Medical Decision Making:   Differential Diagnosis:   Postsurgical pain, strep pharyngitis, COVID-19, influenza, other viral illness, mononucleosis, etc.  ED Management:  Patient negative for mono, negative for flu, negative for COVID, positive for strep.  She is currently taking an oral antibiotic, but has had only 3 doses.  I think it is prudent to administer Bicillin.  She was given 1.2 million units IM.  She is taking Norco for pain with good result.  Airway is patent, and the eschars from surgery look good.  I believe patient is safe for discharge home.  She will follow-up with Dr. Costa via telephone tomorrow morning, return here as needed or if worse in any way.                        Clinical Impression:   Final diagnoses:  [J02.0] Strep pharyngitis  (Primary)        ED Disposition Condition    Discharge Stable          ED Prescriptions    None       Follow-up Information       Follow up With Specialties Details Why Contact Info    Edward Bernardo Costa MD Otolaryngology Call in 1 day  [address] [facility] [address] [phone]      [facility] Emergency Dept Emergency Medicine  As needed, If symptoms worsen [address] [facility] [id number] [phone]             Jose Melendez MD  06/04/23 0429

## 2023-06-05 ENCOUNTER — HOSPITAL ENCOUNTER (OUTPATIENT)
Facility: HOSPITAL | Age: 18
Discharge: ANOTHER HOSPITAL | End: 2023-06-07
Attending: EMERGENCY MEDICINE | Admitting: INTERNAL MEDICINE
Payer: COMMERCIAL

## 2023-06-05 DIAGNOSIS — L03.221 CELLULITIS, NECK: ICD-10-CM

## 2023-06-05 DIAGNOSIS — T81.40XA POSTOPERATIVE INFECTION, UNSPECIFIED TYPE, INITIAL ENCOUNTER: Primary | ICD-10-CM

## 2023-06-05 DIAGNOSIS — R07.9 CHEST PAIN: ICD-10-CM

## 2023-06-05 LAB
ALBUMIN SERPL BCP-MCNC: 3.2 G/DL (ref 3.5–5.2)
ALP SERPL-CCNC: 86 U/L (ref 55–135)
ALT SERPL W/O P-5'-P-CCNC: 18 U/L (ref 10–44)
ANION GAP SERPL CALC-SCNC: 13 MMOL/L (ref 8–16)
AST SERPL-CCNC: 18 U/L (ref 10–40)
BASOPHILS # BLD AUTO: 0.02 K/UL (ref 0–0.2)
BASOPHILS NFR BLD: 0.2 % (ref 0–1.9)
BILIRUB SERPL-MCNC: 0.3 MG/DL (ref 0.1–1)
BUN SERPL-MCNC: 11 MG/DL (ref 6–20)
CALCIUM SERPL-MCNC: 9.6 MG/DL (ref 8.7–10.5)
CHLORIDE SERPL-SCNC: 102 MMOL/L (ref 95–110)
CO2 SERPL-SCNC: 23 MMOL/L (ref 23–29)
CREAT SERPL-MCNC: 0.7 MG/DL (ref 0.5–1.4)
DIFFERENTIAL METHOD: ABNORMAL
EOSINOPHIL # BLD AUTO: 0.3 K/UL (ref 0–0.5)
EOSINOPHIL NFR BLD: 2.8 % (ref 0–8)
ERYTHROCYTE [DISTWIDTH] IN BLOOD BY AUTOMATED COUNT: 12.4 % (ref 11.5–14.5)
EST. GFR  (NO RACE VARIABLE): >60 ML/MIN/1.73 M^2
GLUCOSE SERPL-MCNC: 83 MG/DL (ref 70–110)
HCT VFR BLD AUTO: 34.3 % (ref 37–48.5)
HGB BLD-MCNC: 11.5 G/DL (ref 12–16)
IMM GRANULOCYTES # BLD AUTO: 0.02 K/UL (ref 0–0.04)
IMM GRANULOCYTES NFR BLD AUTO: 0.2 % (ref 0–0.5)
LYMPHOCYTES # BLD AUTO: 2 K/UL (ref 1–4.8)
LYMPHOCYTES NFR BLD: 21.8 % (ref 18–48)
MCH RBC QN AUTO: 28.8 PG (ref 27–31)
MCHC RBC AUTO-ENTMCNC: 33.5 G/DL (ref 32–36)
MCV RBC AUTO: 86 FL (ref 82–98)
MONOCYTES # BLD AUTO: 0.6 K/UL (ref 0.3–1)
MONOCYTES NFR BLD: 6 % (ref 4–15)
NEUTROPHILS # BLD AUTO: 6.4 K/UL (ref 1.8–7.7)
NEUTROPHILS NFR BLD: 69 % (ref 38–73)
NRBC BLD-RTO: 0 /100 WBC
PLATELET # BLD AUTO: 232 K/UL (ref 150–450)
PMV BLD AUTO: 11 FL (ref 9.2–12.9)
POTASSIUM SERPL-SCNC: 3.9 MMOL/L (ref 3.5–5.1)
PROT SERPL-MCNC: 7.3 G/DL (ref 6–8.4)
RBC # BLD AUTO: 4 M/UL (ref 4–5.4)
SODIUM SERPL-SCNC: 138 MMOL/L (ref 136–145)
WBC # BLD AUTO: 9.22 K/UL (ref 3.9–12.7)

## 2023-06-05 PROCEDURE — 63600175 PHARM REV CODE 636 W HCPCS: Performed by: EMERGENCY MEDICINE

## 2023-06-05 PROCEDURE — 96361 HYDRATE IV INFUSION ADD-ON: CPT

## 2023-06-05 PROCEDURE — 87389 HIV-1 AG W/HIV-1&-2 AB AG IA: CPT | Performed by: EMERGENCY MEDICINE

## 2023-06-05 PROCEDURE — 96375 TX/PRO/DX INJ NEW DRUG ADDON: CPT

## 2023-06-05 PROCEDURE — 99285 EMERGENCY DEPT VISIT HI MDM: CPT | Mod: 25

## 2023-06-05 PROCEDURE — 96365 THER/PROPH/DIAG IV INF INIT: CPT | Mod: 59

## 2023-06-05 PROCEDURE — 86803 HEPATITIS C AB TEST: CPT | Performed by: EMERGENCY MEDICINE

## 2023-06-05 PROCEDURE — 80053 COMPREHEN METABOLIC PANEL: CPT | Performed by: EMERGENCY MEDICINE

## 2023-06-05 PROCEDURE — 25000003 PHARM REV CODE 250: Performed by: EMERGENCY MEDICINE

## 2023-06-05 PROCEDURE — 85025 COMPLETE CBC W/AUTO DIFF WBC: CPT | Performed by: EMERGENCY MEDICINE

## 2023-06-05 RX ORDER — MORPHINE SULFATE 2 MG/ML
4 INJECTION, SOLUTION INTRAMUSCULAR; INTRAVENOUS
Status: COMPLETED | OUTPATIENT
Start: 2023-06-05 | End: 2023-06-05

## 2023-06-05 RX ORDER — ONDANSETRON 2 MG/ML
4 INJECTION INTRAMUSCULAR; INTRAVENOUS
Status: COMPLETED | OUTPATIENT
Start: 2023-06-05 | End: 2023-06-05

## 2023-06-05 RX ORDER — DIPHENHYDRAMINE HYDROCHLORIDE 50 MG/ML
25 INJECTION INTRAMUSCULAR; INTRAVENOUS
Status: COMPLETED | OUTPATIENT
Start: 2023-06-05 | End: 2023-06-05

## 2023-06-05 RX ORDER — DEXAMETHASONE SODIUM PHOSPHATE 10 MG/ML
10 INJECTION INTRAMUSCULAR; INTRAVENOUS
Status: COMPLETED | OUTPATIENT
Start: 2023-06-05 | End: 2023-06-05

## 2023-06-05 RX ADMIN — MORPHINE SULFATE 4 MG: 2 INJECTION, SOLUTION INTRAMUSCULAR; INTRAVENOUS at 10:06

## 2023-06-05 RX ADMIN — DEXAMETHASONE SODIUM PHOSPHATE 10 MG: 10 INJECTION INTRAMUSCULAR; INTRAVENOUS at 10:06

## 2023-06-05 RX ADMIN — ONDANSETRON 4 MG: 2 INJECTION INTRAMUSCULAR; INTRAVENOUS at 10:06

## 2023-06-05 RX ADMIN — PIPERACILLIN AND TAZOBACTAM 4.5 G: 4; .5 INJECTION, POWDER, LYOPHILIZED, FOR SOLUTION INTRAVENOUS; PARENTERAL at 10:06

## 2023-06-05 RX ADMIN — SODIUM CHLORIDE 1000 ML: 9 INJECTION, SOLUTION INTRAVENOUS at 10:06

## 2023-06-05 RX ADMIN — DIPHENHYDRAMINE HYDROCHLORIDE 25 MG: 50 INJECTION INTRAMUSCULAR; INTRAVENOUS at 10:06

## 2023-06-06 ENCOUNTER — APPOINTMENT (OUTPATIENT)
Dept: RADIOLOGY | Facility: HOSPITAL | Age: 18
End: 2023-06-06
Payer: COMMERCIAL

## 2023-06-06 PROBLEM — T81.40XA POSTOPERATIVE INFECTION: Status: ACTIVE | Noted: 2023-06-06

## 2023-06-06 PROBLEM — F41.9 ANXIETY AND DEPRESSION: Status: ACTIVE | Noted: 2023-06-06

## 2023-06-06 PROBLEM — J02.8 ACUTE BACTERIAL PHARYNGITIS: Status: ACTIVE | Noted: 2023-06-06

## 2023-06-06 PROBLEM — B96.89 ACUTE BACTERIAL PHARYNGITIS: Status: ACTIVE | Noted: 2023-06-06

## 2023-06-06 PROBLEM — D62 ACUTE POSTOPERATIVE ANEMIA DUE TO EXPECTED BLOOD LOSS: Status: ACTIVE | Noted: 2023-06-06

## 2023-06-06 PROBLEM — F32.A ANXIETY AND DEPRESSION: Status: ACTIVE | Noted: 2023-06-06

## 2023-06-06 LAB
ANION GAP SERPL CALC-SCNC: 12 MMOL/L (ref 8–16)
BASOPHILS # BLD AUTO: 0.01 K/UL (ref 0–0.2)
BASOPHILS NFR BLD: 0.1 % (ref 0–1.9)
BUN SERPL-MCNC: 11 MG/DL (ref 6–20)
CALCIUM SERPL-MCNC: 9.8 MG/DL (ref 8.7–10.5)
CHLORIDE SERPL-SCNC: 105 MMOL/L (ref 95–110)
CO2 SERPL-SCNC: 20 MMOL/L (ref 23–29)
CREAT SERPL-MCNC: 0.6 MG/DL (ref 0.5–1.4)
DIFFERENTIAL METHOD: ABNORMAL
EOSINOPHIL # BLD AUTO: 0 K/UL (ref 0–0.5)
EOSINOPHIL NFR BLD: 0 % (ref 0–8)
ERYTHROCYTE [DISTWIDTH] IN BLOOD BY AUTOMATED COUNT: 12.1 % (ref 11.5–14.5)
EST. GFR  (NO RACE VARIABLE): >60 ML/MIN/1.73 M^2
GLUCOSE SERPL-MCNC: 126 MG/DL (ref 70–110)
HCT VFR BLD AUTO: 37.3 % (ref 37–48.5)
HCV AB SERPL QL IA: NORMAL
HGB BLD-MCNC: 12.6 G/DL (ref 12–16)
HIV 1+2 AB+HIV1 P24 AG SERPL QL IA: NORMAL
IMM GRANULOCYTES # BLD AUTO: 0.03 K/UL (ref 0–0.04)
IMM GRANULOCYTES NFR BLD AUTO: 0.4 % (ref 0–0.5)
LYMPHOCYTES # BLD AUTO: 0.6 K/UL (ref 1–4.8)
LYMPHOCYTES NFR BLD: 8.2 % (ref 18–48)
MAGNESIUM SERPL-MCNC: 2 MG/DL (ref 1.6–2.6)
MCH RBC QN AUTO: 29 PG (ref 27–31)
MCHC RBC AUTO-ENTMCNC: 33.8 G/DL (ref 32–36)
MCV RBC AUTO: 86 FL (ref 82–98)
MONOCYTES # BLD AUTO: 0.1 K/UL (ref 0.3–1)
MONOCYTES NFR BLD: 0.9 % (ref 4–15)
NEUTROPHILS # BLD AUTO: 6.9 K/UL (ref 1.8–7.7)
NEUTROPHILS NFR BLD: 90.4 % (ref 38–73)
NRBC BLD-RTO: 0 /100 WBC
PHOSPHATE SERPL-MCNC: 3.6 MG/DL (ref 2.7–4.5)
PLATELET # BLD AUTO: 295 K/UL (ref 150–450)
PMV BLD AUTO: 11.5 FL (ref 9.2–12.9)
POTASSIUM SERPL-SCNC: 4.5 MMOL/L (ref 3.5–5.1)
RBC # BLD AUTO: 4.34 M/UL (ref 4–5.4)
SODIUM SERPL-SCNC: 137 MMOL/L (ref 136–145)
WBC # BLD AUTO: 7.64 K/UL (ref 3.9–12.7)

## 2023-06-06 PROCEDURE — 25000003 PHARM REV CODE 250: Performed by: FAMILY MEDICINE

## 2023-06-06 PROCEDURE — 70491 CT SOFT TISSUE NECK WITH CONTRAST: ICD-10-PCS | Mod: 26,,, | Performed by: RADIOLOGY

## 2023-06-06 PROCEDURE — 63600175 PHARM REV CODE 636 W HCPCS: Performed by: INTERNAL MEDICINE

## 2023-06-06 PROCEDURE — 84100 ASSAY OF PHOSPHORUS: CPT | Performed by: FAMILY MEDICINE

## 2023-06-06 PROCEDURE — 83735 ASSAY OF MAGNESIUM: CPT | Performed by: FAMILY MEDICINE

## 2023-06-06 PROCEDURE — 96361 HYDRATE IV INFUSION ADD-ON: CPT

## 2023-06-06 PROCEDURE — 70491 CT SOFT TISSUE NECK W/DYE: CPT | Mod: TC

## 2023-06-06 PROCEDURE — G0378 HOSPITAL OBSERVATION PER HR: HCPCS

## 2023-06-06 PROCEDURE — 25000003 PHARM REV CODE 250: Performed by: INTERNAL MEDICINE

## 2023-06-06 PROCEDURE — 25500020 PHARM REV CODE 255: Performed by: EMERGENCY MEDICINE

## 2023-06-06 PROCEDURE — 99223 1ST HOSP IP/OBS HIGH 75: CPT | Mod: 95,,, | Performed by: INTERNAL MEDICINE

## 2023-06-06 PROCEDURE — 80048 BASIC METABOLIC PNL TOTAL CA: CPT | Performed by: FAMILY MEDICINE

## 2023-06-06 PROCEDURE — 96365 THER/PROPH/DIAG IV INF INIT: CPT

## 2023-06-06 PROCEDURE — 96366 THER/PROPH/DIAG IV INF ADDON: CPT

## 2023-06-06 PROCEDURE — 85025 COMPLETE CBC W/AUTO DIFF WBC: CPT | Performed by: FAMILY MEDICINE

## 2023-06-06 PROCEDURE — 96376 TX/PRO/DX INJ SAME DRUG ADON: CPT

## 2023-06-06 PROCEDURE — 70491 CT SOFT TISSUE NECK W/DYE: CPT | Mod: 26,,, | Performed by: RADIOLOGY

## 2023-06-06 PROCEDURE — 99223 PR INITIAL HOSPITAL CARE,LEVL III: ICD-10-PCS | Mod: 95,,, | Performed by: INTERNAL MEDICINE

## 2023-06-06 RX ORDER — DEXTROSE 40 %
15 GEL (GRAM) ORAL
Status: DISCONTINUED | OUTPATIENT
Start: 2023-06-06 | End: 2023-06-07 | Stop reason: HOSPADM

## 2023-06-06 RX ORDER — AMOXICILLIN 250 MG
1 CAPSULE ORAL 2 TIMES DAILY PRN
Status: DISCONTINUED | OUTPATIENT
Start: 2023-06-06 | End: 2023-06-07 | Stop reason: HOSPADM

## 2023-06-06 RX ORDER — POLYETHYLENE GLYCOL 3350 17 G/17G
17 POWDER, FOR SOLUTION ORAL DAILY
Status: DISCONTINUED | OUTPATIENT
Start: 2023-06-06 | End: 2023-06-07 | Stop reason: HOSPADM

## 2023-06-06 RX ORDER — OXYCODONE AND ACETAMINOPHEN 10; 325 MG/1; MG/1
1 TABLET ORAL EVERY 6 HOURS PRN
Status: COMPLETED | OUTPATIENT
Start: 2023-06-06 | End: 2023-06-06

## 2023-06-06 RX ORDER — OXYCODONE AND ACETAMINOPHEN 7.5; 325 MG/1; MG/1
1 TABLET ORAL EVERY 4 HOURS PRN
Status: DISCONTINUED | OUTPATIENT
Start: 2023-06-06 | End: 2023-06-07

## 2023-06-06 RX ORDER — SODIUM CHLORIDE 0.9 % (FLUSH) 0.9 %
10 SYRINGE (ML) INJECTION EVERY 12 HOURS PRN
Status: DISCONTINUED | OUTPATIENT
Start: 2023-06-06 | End: 2023-06-07 | Stop reason: HOSPADM

## 2023-06-06 RX ORDER — GLUCAGON 1 MG
1 KIT INJECTION
Status: DISCONTINUED | OUTPATIENT
Start: 2023-06-06 | End: 2023-06-07 | Stop reason: HOSPADM

## 2023-06-06 RX ORDER — OXYCODONE AND ACETAMINOPHEN 10; 325 MG/1; MG/1
1 TABLET ORAL EVERY 4 HOURS PRN
Status: COMPLETED | OUTPATIENT
Start: 2023-06-06 | End: 2023-06-06

## 2023-06-06 RX ORDER — TALC
6 POWDER (GRAM) TOPICAL NIGHTLY PRN
Status: DISCONTINUED | OUTPATIENT
Start: 2023-06-06 | End: 2023-06-07 | Stop reason: HOSPADM

## 2023-06-06 RX ORDER — NALOXONE HCL 0.4 MG/ML
0.02 VIAL (ML) INJECTION
Status: DISCONTINUED | OUTPATIENT
Start: 2023-06-06 | End: 2023-06-07 | Stop reason: HOSPADM

## 2023-06-06 RX ORDER — ONDANSETRON 2 MG/ML
4 INJECTION INTRAMUSCULAR; INTRAVENOUS EVERY 8 HOURS PRN
Status: DISCONTINUED | OUTPATIENT
Start: 2023-06-06 | End: 2023-06-07 | Stop reason: HOSPADM

## 2023-06-06 RX ORDER — ACETAMINOPHEN 325 MG/1
650 TABLET ORAL EVERY 4 HOURS PRN
Status: DISCONTINUED | OUTPATIENT
Start: 2023-06-06 | End: 2023-06-07 | Stop reason: HOSPADM

## 2023-06-06 RX ORDER — IPRATROPIUM BROMIDE AND ALBUTEROL SULFATE 2.5; .5 MG/3ML; MG/3ML
3 SOLUTION RESPIRATORY (INHALATION) EVERY 4 HOURS PRN
Status: DISCONTINUED | OUTPATIENT
Start: 2023-06-06 | End: 2023-06-07 | Stop reason: HOSPADM

## 2023-06-06 RX ORDER — SIMETHICONE 80 MG
1 TABLET,CHEWABLE ORAL 4 TIMES DAILY PRN
Status: DISCONTINUED | OUTPATIENT
Start: 2023-06-06 | End: 2023-06-07 | Stop reason: HOSPADM

## 2023-06-06 RX ORDER — DEXTROSE 40 %
30 GEL (GRAM) ORAL
Status: DISCONTINUED | OUTPATIENT
Start: 2023-06-06 | End: 2023-06-07 | Stop reason: HOSPADM

## 2023-06-06 RX ORDER — AMOXICILLIN 250 MG
1 CAPSULE ORAL 2 TIMES DAILY
Status: DISCONTINUED | OUTPATIENT
Start: 2023-06-06 | End: 2023-06-07 | Stop reason: HOSPADM

## 2023-06-06 RX ORDER — OXYCODONE HYDROCHLORIDE 5 MG/1
10 TABLET ORAL EVERY 6 HOURS PRN
Status: DISCONTINUED | OUTPATIENT
Start: 2023-06-06 | End: 2023-06-06

## 2023-06-06 RX ORDER — OXYCODONE AND ACETAMINOPHEN 5; 325 MG/1; MG/1
1 TABLET ORAL EVERY 4 HOURS PRN
Status: DISCONTINUED | OUTPATIENT
Start: 2023-06-06 | End: 2023-06-07

## 2023-06-06 RX ORDER — SODIUM CHLORIDE 9 MG/ML
INJECTION, SOLUTION INTRAVENOUS CONTINUOUS
Status: DISCONTINUED | OUTPATIENT
Start: 2023-06-06 | End: 2023-06-07

## 2023-06-06 RX ORDER — OXYCODONE HYDROCHLORIDE 5 MG/1
5 TABLET ORAL EVERY 6 HOURS PRN
Status: DISCONTINUED | OUTPATIENT
Start: 2023-06-06 | End: 2023-06-06

## 2023-06-06 RX ORDER — PROCHLORPERAZINE EDISYLATE 5 MG/ML
5 INJECTION INTRAMUSCULAR; INTRAVENOUS EVERY 6 HOURS PRN
Status: DISCONTINUED | OUTPATIENT
Start: 2023-06-06 | End: 2023-06-07 | Stop reason: HOSPADM

## 2023-06-06 RX ORDER — MAG HYDROX/ALUMINUM HYD/SIMETH 200-200-20
30 SUSPENSION, ORAL (FINAL DOSE FORM) ORAL 4 TIMES DAILY PRN
Status: DISCONTINUED | OUTPATIENT
Start: 2023-06-06 | End: 2023-06-07 | Stop reason: HOSPADM

## 2023-06-06 RX ADMIN — OXYCODONE HYDROCHLORIDE AND ACETAMINOPHEN 1 TABLET: 10; 325 TABLET ORAL at 07:06

## 2023-06-06 RX ADMIN — AMPICILLIN SODIUM AND SULBACTAM SODIUM 3 G: 2; 1 INJECTION, POWDER, FOR SOLUTION INTRAMUSCULAR; INTRAVENOUS at 09:06

## 2023-06-06 RX ADMIN — DOCUSATE SODIUM 50MG AND SENNOSIDES 8.6MG 1 TABLET: 8.6; 5 TABLET, FILM COATED ORAL at 10:06

## 2023-06-06 RX ADMIN — AMPICILLIN SODIUM AND SULBACTAM SODIUM 3 G: 2; 1 INJECTION, POWDER, FOR SOLUTION INTRAMUSCULAR; INTRAVENOUS at 02:06

## 2023-06-06 RX ADMIN — POLYETHYLENE GLYCOL 3350 17 G: 17 POWDER, FOR SOLUTION ORAL at 10:06

## 2023-06-06 RX ADMIN — SODIUM CHLORIDE: 9 INJECTION, SOLUTION INTRAVENOUS at 09:06

## 2023-06-06 RX ADMIN — OXYCODONE HYDROCHLORIDE AND ACETAMINOPHEN 1 TABLET: 10; 325 TABLET ORAL at 12:06

## 2023-06-06 RX ADMIN — IOHEXOL 75 ML: 350 INJECTION, SOLUTION INTRAVENOUS at 02:06

## 2023-06-06 RX ADMIN — ONDANSETRON 4 MG: 2 INJECTION INTRAMUSCULAR; INTRAVENOUS at 06:06

## 2023-06-06 RX ADMIN — SODIUM CHLORIDE: 9 INJECTION, SOLUTION INTRAVENOUS at 11:06

## 2023-06-06 NOTE — PLAN OF CARE
"[place] - Intensive Care  Initial Discharge Assessment       Primary Care Provider: Sanjay Thapa MD    Admission Diagnosis: Cellulitis, neck [L03.221]  Chest pain [R07.9]  Postoperative infection, unspecified type, initial encounter [T81.40XA]    Admission Date: 6/5/2023  Expected Discharge Date:   Assessement completed with patient who was alert and oriented. Patient's mother, Candida Cross [phone] was at bedside. Patient lives with her parents and sister (17 years old). She does not use any equipment for mobility or ADLs, does not have any home health servifes or OPCM and has no specialty doctors. Patient drives self to errands and appointments. Patient has a one month old baby. She is on assistance and participates in WIC. Her PCP is Dr. Sanjay Thapa who is an obstetrician. Patient refused to get established with a family doctor, insisting that Dr. Braga is fine. Patient denies any additional needs at this time. Case management will continue to follow.  Transition of Care Barriers: None    Payor: BLUE CROSS BLUE SHIELD / Plan: [place] PPO / Product Type: PPO /     Extended Emergency Contact Information  Primary Emergency Contact: Candida Cross  Address: [address]            [place] [address] [facility]  Home Phone: [phone]  Mobile Phone: [phone]  Relation: Mother  Preferred language: English   needed? No  Secondary Emergency Contact: Jaime Asencio "Sargent"  Address: [address] [place] [place] [address] [facility]  Home Phone: [phone]  Mobile Phone: [phone]  Relation: Significant other  Preferred language: English   needed? No  Father: Kash Cross  Address: [address]            [place] [address] [facility]  Home Phone: [phone]  Mobile Phone: [phone]    Discharge Plan A: Home with family  Discharge Plan B: Home with family      [facility] - [facility] [place] - [address]  [address]  [place] [address]  Phone: [phone] Fax: [phone]    [id number] - [place], [place] - [address]  [address]  [place] [address]  Phone: [phone] Fax: [phone]    [id number] - [facility] [place] - [address]  [address]  [facility] [place] [address]  Phone: [phone] Fax: [phone]      Initial Assessment (most recent)       Adult Discharge Assessment - 06/06/23 1059          Discharge Assessment    Assessment Type Discharge Planning Assessment     Confirmed/corrected address, phone number and insurance Yes     Confirmed Demographics Correct on Facesheet     Source of Information patient;family   motherCandida [phone]    Does patient/caregiver understand observation status Yes     Communicated AMY with patient/caregiver Date not available/Unable to determine     Reason For Admission postoperative infection     People in Home parent(s);sibling(s)     Do you expect to return to your current living situation? Yes     Do you have help at home or someone to help you manage your care at home? Yes     Who are your caregiver(s) and their phone number(s)? Candida arriaga [phone]     Prior to hospitilization cognitive status: Alert/Oriented     Current cognitive status: Alert/Oriented     Equipment Currently Used at Home none     Readmission within 30 days? No     Patient currently being followed by outpatient case management? No     Do you currently have service(s) that help you manage your care at home? No     Who is going to help you get home at discharge? Candida arriaga [phone]     How do you get to doctors appointments? car, drives self     Are you on dialysis? No     Do you take coumadin? No     Discharge Plan A Home with family     Discharge Plan B Home with family     DME Needed Upon Discharge  none      Discharge Plan discussed with: Patient;Parent(s)     Name(s) and Number(s) Candida arriaga [phone]     Transition of Care Barriers None        Physical Activity    On average, how many days per week do you engage in moderate to strenuous exercise (like a brisk walk)? 0 days     On average, how many minutes do you engage in exercise at this level? 0 min        Financial Resource Strain    How hard is it for you to pay for the very basics like food, housing, medical care, and heating? Somewhat hard        Housing Stability    In the last 12 months, was there a time when you were not able to pay the mortgage or rent on time? No     In the last 12 months, was there a time when you did not have a steady place to sleep or slept in a shelter (including now)? No        Transportation Needs    In the past 12 months, has lack of transportation kept you from medical appointments or from getting medications? No     In the past 12 months, has lack of transportation kept you from meetings, work, or from getting things needed for daily living? No        Food Insecurity    Within the past 12 months, you worried that your food would run out before you got the money to buy more. Never true     Within the past 12 months, the food you bought just didn't last and you didn't have money to get more. Never true        Stress    Do you feel stress - tense, restless, nervous, or anxious, or unable to sleep at night because your mind is troubled all the time - these days? Not at all        Social Connections    In a typical week, how many times do you talk on the phone with family, friends, or neighbors? More than three times a week     How often do you get together with friends or relatives? Once a week     How often do you attend Christianity or Cheondoism services? More than 4 times per year     Do you belong to any clubs or organizations such as Christianity groups, unions, fraternal or athletic groups, or school groups? No     How often  do you attend meetings of the clubs or organizations you belong to? Never     Are you , , , , never , or living with a partner? Never         Alcohol Use    Q1: How often do you have a drink containing alcohol? Monthly or less     Q2: How many drinks containing alcohol do you have on a typical day when you are drinking? 1 or 2     Q3: How often do you have six or more drinks on one occasion? Never        OTHER    Name(s) of People in Home motherCandida [phone], father and sister

## 2023-06-06 NOTE — HPI
"Ms. Cross is a 18yo lady with a past medical history of anxiety, depression, asthma, and obesity.  She is also followed by ENT for hypertrophy of nasal turbinates, upper respiratory tract obstruction, and chronic tonsillitis and adenoiditis.    She was admitted by ENT, Dr. Costa on 6/2/23 and underwent EXCISION, NASAL TURBINATE, SUBMUCOSAL (Bilateral) and TONSILLECTOMY AND ADENOIDECTOMY (Bilateral) that same day on Cefdinir.  She had to return to the ED on 6/4/23 due to new onset of fevers to 102.8F with a WBC of 20.  Her GAS molecular test was positive in the ED.  Dr. Melendez noted, "She is currently taking an oral antibiotic, but has had only 3 doses.  I think it is prudent to administer Bicillin.  She was given 1.2 million units IM."    Her mother now brings her back to the ED due to worsening fevers at home (apparently up to 105.6F) with submandibular swelling and redness that has worsened since her last ED visit.  She denies swallowing difficulties, or symptoms of stridor.  She does have some mild sore throat, but improved.  Her mother notes a large area of swelling and bright redness to her right submandibular area that she traced out in a triangle shape.  This redness began late Monday night after leaving the ED.  The patient also feels the right lower submandibular area is the source of most of her pain as well.  Her other complaints are profound fatigue, malaise, fevers, and headache.  She denies N/V/D.        In the ED her VS were BP (!) 106/59   Pulse 71   Temp 98.5 °F (36.9 °C) (Oral) Comment: last took pain med with tylenol around 1730  Resp 19   Ht 5' 7" (1.702 m)   Wt 106.6 kg (235 lb)   LMP 08/12/2022 (Exact Date) Comment: hcg negative  SpO2 98%   Breastfeeding No   BMI 36.81 kg/m².  Labs showed WBC 9, Hg 11.5, Cr 0.7.    CT neck with contrast showed mild prominence of the bilateral submandibular glands with inflammatory stranding slightly more prominent on the right.  No abscess.    In " the ED she was treated with:  Medications   piperacillin-tazobactam (ZOSYN) 4.5 g in dextrose 5 % in water (D5W) 5 % 100 mL IVPB (MB+) (0 g Intravenous Stopped 6/5/23 2241)   sodium chloride 0.9% bolus 1,000 mL 1,000 mL (0 mLs Intravenous Stopped 6/5/23 2316)   dexAMETHasone sodium phos (PF) injection 10 mg (10 mg Intravenous Given 6/5/23 2212)   morphine injection 4 mg (4 mg Intravenous Given 6/5/23 2212)   ondansetron injection 4 mg (4 mg Intravenous Given 6/5/23 2212)   diphenhydrAMINE injection 25 mg (25 mg Intravenous Given 6/5/23 2244)   iohexoL (OMNIPAQUE 350) injection 75 mL (75 mLs Intravenous Given 6/6/23 0209)

## 2023-06-06 NOTE — ED PROVIDER NOTES
Encounter Date: 6/5/2023       History     Chief Complaint   Patient presents with    Post-op Problem     Pt has been having fevers (up to 105.6 reportedly) with edema and redness to tonsil surgical site. Pt was seen here for the same yesterday.      Pt is POD3 s/p T&A by Dr Costa here again with c/o fever now with swelling and redness under her chin that started today. She says her throat feels better and she can swallow. Now her neck hurts. No difficulty breathing. No ear pain. Seen here for pain and fever early Sun am. Mom says they called Dr Costa' office but no answer.     The history is provided by the patient and a parent.   Review of patient's allergies indicates:  No Known Allergies  Past Medical History:   Diagnosis Date    Anxiety     Asthma     Depression     Herpes simplex virus (HSV) infection     Obesity, unspecified      Past Surgical History:   Procedure Laterality Date    COLONOSCOPY N/A 05/27/2020    Procedure: COLONOSCOPY;  Surgeon: Bunny Eaton MD;  Location: [facility];  Service: Endoscopy;  Laterality: N/A;    COLONOSCOPY       Family History   Problem Relation Age of Onset    Thyroid disease Mother     Heart disease Paternal Grandmother      Social History     Tobacco Use    Smoking status: Some Days     Types: Vaping with nicotine    Smokeless tobacco: Never   Substance Use Topics    Alcohol use: Not Currently    Drug use: Never     Review of Systems   Constitutional:  Positive for chills and fever.   HENT:  Positive for sore throat. Negative for ear pain, facial swelling, trouble swallowing and voice change.    All other systems reviewed and are negative.    Physical Exam     Initial Vitals [06/05/23 2035]   BP Pulse Resp Temp SpO2   108/69 81 14 98.5 °F (36.9 °C) 99 %      MAP       --         Physical Exam    Nursing note and vitals reviewed.  Constitutional: She appears well-developed and well-nourished. She is not diaphoretic. No distress.   HENT:   Head: Normocephalic and  atraumatic.   Nose: Nose normal.   Mild/mod swelling and erythema to submandibular area mainly on right. Area is ttp and warm but soft without fluctuance. OP clear. She has mild trismus so unable to clearly see her oropharynx. No stridor.    Eyes: Conjunctivae and EOM are normal. No scleral icterus.   Neck:   Painful ROM 2/2 her anterior neck pain.    Cardiovascular:  Normal rate, regular rhythm, normal heart sounds and intact distal pulses.           Pulmonary/Chest: Breath sounds normal. No stridor.   Abdominal: Abdomen is soft. There is no abdominal tenderness.     Neurological: She is alert and oriented to person, place, and time. GCS score is 15. GCS eye subscore is 4. GCS verbal subscore is 5. GCS motor subscore is 6.   Skin: Skin is warm and dry. Capillary refill takes less than 2 seconds. No rash and no abscess noted. There is erythema. No pallor.   Psychiatric: She has a normal mood and affect.       ED Course   Procedures  Labs Reviewed   CBC W/ AUTO DIFFERENTIAL - Abnormal; Notable for the following components:       Result Value    Hemoglobin 11.5 (*)     Hematocrit 34.3 (*)     All other components within normal limits   COMPREHENSIVE METABOLIC PANEL - Abnormal; Notable for the following components:    Albumin 3.2 (*)     All other components within normal limits   HIV 1 / 2 ANTIBODY   HEPATITIS C ANTIBODY          Imaging Results              CT Soft Tissue Neck With Contrast (In process)                      Medications   piperacillin-tazobactam (ZOSYN) 4.5 g in dextrose 5 % in water (D5W) 5 % 100 mL IVPB (MB+) (0 g Intravenous Stopped 6/5/23 2241)   sodium chloride 0.9% bolus 1,000 mL 1,000 mL (0 mLs Intravenous Stopped 6/5/23 2316)   dexAMETHasone sodium phos (PF) injection 10 mg (10 mg Intravenous Given 6/5/23 2212)   morphine injection 4 mg (4 mg Intravenous Given 6/5/23 2212)   ondansetron injection 4 mg (4 mg Intravenous Given 6/5/23 2212)   diphenhydrAMINE injection 25 mg (25 mg Intravenous  Given 6/5/23 2244)   iohexoL (OMNIPAQUE 350) injection 75 mL (75 mLs Intravenous Given 6/6/23 0209)     Medical Decision Making:   Differential Diagnosis:   Ludwigs angina, abscess/postop infection, cellulitis  Clinical Tests:   Lab Tests: Ordered and Reviewed  ED Management:  Pt presented with progressive neck swelling and redness with fever since having T&A on Friday by Dr Costa. Exam concerning for abscess. CBC, CMP unremarkable. CT scanner malfunction so pt to be transferred just for CT and will return here for dispo. Mother agrees with plan. Pt with some improvement in her sxs and her swelling with treatment here. She was given zosyn and decadron. She had some itching that was present prior to arrival. No rash to point to allergic response to the cefdinir. She was also taking narcotics for pain which could be causing her itching. She was given benadryl with improvement in this. She had no airway compromise.     Pt returned from [facility] after CT scan. Plan admit for IV abx and ENT eval in am. CT report pending.            ED Course as of 06/06/23 0253 Mon Jun 05, 2023 2219 C/o itching but this present since surgery. No rash. Probably 2/2 narcotics. Benadryl ordered. [DC]   2247 CT malfunction causing dispo delay.  [DC]      ED Course User Index  [DC] Kash Matute Jr., MD                 Clinical Impression:   Final diagnoses:  [T81.40XA] Postoperative infection, unspecified type, initial encounter (Primary)  [L03.221] Cellulitis, neck        ED Disposition Condition    Observation Stable                Kash Matute Jr., MD  06/05/23 2340       Kash Matute Jr., MD  06/06/23 0253

## 2023-06-06 NOTE — ASSESSMENT & PLAN NOTE
"She is also followed by ENT for hypertrophy of nasal turbinates, upper respiratory tract obstruction, and chronic tonsillitis and adenoiditis.    She was admitted by ENT, Dr. Costa on 6/2/23 and underwent EXCISION, NASAL TURBINATE, SUBMUCOSAL (Bilateral) and TONSILLECTOMY AND ADENOIDECTOMY (Bilateral) that same day on Cefdinir.  She had to return to the ED on 6/4/23 due to new onset of fevers to 102.8F with a WBC of 20.  Her GAS molecular test was positive in the ED.  Dr. Melendez noted, "She is currently taking an oral antibiotic, but has had only 3 doses.  I think it is prudent to administer Bicillin.  She was given 1.2 million units IM."    "

## 2023-06-06 NOTE — ED NOTES
Received call from transfer center stating that the pt will be set up for transfer to [facility] for ct then back. Pt and mother are under the impression that they will be going POV. MD is updated and at bedside for consult.    Qbrexza Counseling:  I discussed with the patient the risks of Qbrexza including but not limited to headache, mydriasis, blurred vision, dry eyes, nasal dryness, dry mouth, dry throat, dry skin, urinary hesitation, and constipation.  Local skin reactions including erythema, burning, stinging, and itching can also occur.

## 2023-06-06 NOTE — NURSING
Pt arrived on floor via WC. No gaines in place. IV saline locked. VSS and afebrile. Pt oriented to room, room service, and call light. Plan of care reviewed with patient. All patient needs met and anticipated

## 2023-06-06 NOTE — ASSESSMENT & PLAN NOTE
Stable to monitor     Latest Reference Range & Units 05/31/23 12:47 06/04/23 03:04 06/05/23 21:59   Hemoglobin 12.0 - 16.0 g/dL 13.4 12.3 11.5 (L)

## 2023-06-06 NOTE — H&P
"[facility] Medicine  History & Physical    Patient Name: Tatiana Cross  MRN: [id number]  Admission Date: 6/5/2023  Attending Physician: LETICIA Richardson MD   Primary Care Provider: Sanjay Thapa MD         Patient information was obtained from patient, parent, past medical records and ER records.       Subjective:     Principal Problem:Acute bacterial pharyngitis    Chief Complaint:   Chief Complaint   Patient presents with    Post-op Problem     Pt has been having fevers (up to 105.6 reportedly) with edema and redness to tonsil surgical site. Pt was seen here for the same yesterday.         HPI:   Ms. Cross is a 18yo lady with a past medical history of anxiety, depression, asthma, and obesity.  She is also followed by ENT for hypertrophy of nasal turbinates, upper respiratory tract obstruction, and chronic tonsillitis and adenoiditis.    She was admitted by ENT, Dr. Costa on 6/2/23 and underwent EXCISION, NASAL TURBINATE, SUBMUCOSAL (Bilateral) and TONSILLECTOMY AND ADENOIDECTOMY (Bilateral) that same day on Cefdinir.  She had to return to the ED on 6/4/23 due to new onset of fevers to 102.8F with a WBC of 20.  Her GAS molecular test was positive in the ED.  Dr. Melendez noted, "She is currently taking an oral antibiotic, but has had only 3 doses.  I think it is prudent to administer Bicillin.  She was given 1.2 million units IM."    Her mother now brings her back to the ED due to worsening fevers at home (apparently up to 105.6F) with submandibular swelling and redness that has worsened since her last ED visit.  She denies swallowing difficulties, or symptoms of stridor.  She does have some mild sore throat, but improved.  Her mother notes a large area of swelling and bright redness to her right submandibular area that she traced out in a triangle shape.  This redness began late Monday night after leaving the ED.  The patient also feels the right lower submandibular area is the source " "of most of her pain as well.  Her other complaints are profound fatigue, malaise, fevers, and headache.  She denies N/V/D.        In the ED her VS were BP (!) 106/59   Pulse 71   Temp 98.5 °F (36.9 °C) (Oral) Comment: last took pain med with tylenol around 1730  Resp 19   Ht 5' 7" (1.702 m)   Wt 106.6 kg (235 lb)   LMP 08/12/2022 (Exact Date) Comment: hcg negative  SpO2 98%   Breastfeeding No   BMI 36.81 kg/m².  Labs showed WBC 9, Hg 11.5, Cr 0.7.    CT neck with contrast showed mild prominence of the bilateral submandibular glands with inflammatory stranding slightly more prominent on the right.  No abscess.    In the ED she was treated with:  Medications   piperacillin-tazobactam (ZOSYN) 4.5 g in dextrose 5 % in water (D5W) 5 % 100 mL IVPB (MB+) (0 g Intravenous Stopped 6/5/23 2241)   sodium chloride 0.9% bolus 1,000 mL 1,000 mL (0 mLs Intravenous Stopped 6/5/23 2316)   dexAMETHasone sodium phos (PF) injection 10 mg (10 mg Intravenous Given 6/5/23 2212)   morphine injection 4 mg (4 mg Intravenous Given 6/5/23 2212)   ondansetron injection 4 mg (4 mg Intravenous Given 6/5/23 2212)   diphenhydrAMINE injection 25 mg (25 mg Intravenous Given 6/5/23 2244)   iohexoL (OMNIPAQUE 350) injection 75 mL (75 mLs Intravenous Given 6/6/23 0209)                   Past Medical History:   Diagnosis Date    Anxiety     Asthma     Depression     Herpes simplex virus (HSV) infection     Obesity, unspecified        Past Surgical History:   Procedure Laterality Date    COLONOSCOPY N/A 05/27/2020    Procedure: COLONOSCOPY;  Surgeon: Bunny Eaton MD;  [facility];  Service: Endoscopy;  Laterality: N/A;    COLONOSCOPY         Review of patient's allergies indicates:  No Known Allergies    No current facility-administered medications on file prior to encounter.     Current Outpatient Medications on File Prior to Encounter   Medication Sig    albuterol (PROVENTIL/VENTOLIN HFA) 90 mcg/actuation inhaler Inhale 2 " puffs into the lungs every 4 (four) hours as needed.    FLUoxetine 20 MG capsule Take 20 mg by mouth once daily.    PNV cmb#95-ferrous fumarate-FA (PRENATAL) 28 mg iron- 800 mcg Tab Take 1 tablet by mouth once daily.    valACYclovir (VALTREX) 500 MG tablet Take 500 mg by mouth.    VYVANSE 50 mg capsule Take 50 mg by mouth every morning.     Family History       Problem Relation (Age of Onset)    Heart disease Paternal Grandmother    Thyroid disease Mother          Tobacco Use    Smoking status: Some Days     Types: Vaping with nicotine    Smokeless tobacco: Never   Substance and Sexual Activity    Alcohol use: Not Currently    Drug use: Never    Sexual activity: Yes     Partners: Male     Review of Systems   Constitutional:  Positive for activity change, appetite change, chills, fatigue and fever.   HENT:  Positive for facial swelling, sore throat and voice change.    Respiratory:  Negative for shortness of breath.    Cardiovascular:  Negative for chest pain.   Gastrointestinal:  Negative for nausea and vomiting.   Endocrine: Positive for cold intolerance.   Genitourinary:  Negative for dysuria.   Musculoskeletal:  Positive for myalgias.   Skin:  Positive for color change and rash.   Neurological:  Positive for headaches.   Hematological:  Does not bruise/bleed easily.   Psychiatric/Behavioral:  Positive for sleep disturbance.    Objective:     Vital Signs (Most Recent):  Temp: 98.5 °F (36.9 °C) (last took pain med with tylenol around 1730) (06/05/23 2035)  Pulse: 71 (06/05/23 2206)  Resp: 19 (06/05/23 2212)  BP: (!) 106/59 (06/05/23 2330)  SpO2: 98 % (06/05/23 2330) Vital Signs (24h Range):  Temp:  [98.5 °F (36.9 °C)] 98.5 °F (36.9 °C)  Pulse:  [71-81] 71  Resp:  [14-19] 19  SpO2:  [98 %-100 %] 98 %  BP: (106-113)/(59-73) 106/59     Weight: 106.6 kg (235 lb)  Body mass index is 36.81 kg/m².     Physical Exam  Constitutional:       General: She is not in acute distress.     Appearance: She is obese. She is  "ill-appearing. She is not toxic-appearing or diaphoretic.   HENT:      Head: No right periorbital erythema or left periorbital erythema.      Jaw: Swelling and pain on movement present.   Neck:      Comments: Right submandibular area is swollen with a patch of erythema in a triangular shape.  On self-palpation on camera, the patient reports the skin being, "tense and painful"  Pulmonary:      Effort: No tachypnea or bradypnea.   Abdominal:      General: Abdomen is protuberant.   Genitourinary:     Comments: No gaines in place  Neurological:      Mental Status: She is oriented to person, place, and time. She is lethargic.      GCS: GCS eye subscore is 4. GCS verbal subscore is 5. GCS motor subscore is 6.   Psychiatric:         Attention and Perception: Attention and perception normal.         Mood and Affect: Mood is depressed. Affect is flat.         Cognition and Memory: Cognition and memory normal.              Significant Labs: All pertinent labs within the past 24 hours have been reviewed.  Recent Results (from the past 24 hour(s))   Complete Blood Count (CBC)    Collection Time: 06/05/23  9:59 PM   Result Value Ref Range    WBC 9.22 3.90 - 12.70 K/uL    RBC 4.00 4.00 - 5.40 M/uL    Hemoglobin 11.5 (L) 12.0 - 16.0 g/dL    Hematocrit 34.3 (L) 37.0 - 48.5 %    MCV 86 82 - 98 fL    MCH 28.8 27.0 - 31.0 pg    MCHC 33.5 32.0 - 36.0 g/dL    RDW 12.4 11.5 - 14.5 %    Platelets 232 150 - 450 K/uL    MPV 11.0 9.2 - 12.9 fL    Immature Granulocytes 0.2 0.0 - 0.5 %    Gran # (ANC) 6.4 1.8 - 7.7 K/uL    Immature Grans (Abs) 0.02 0.00 - 0.04 K/uL    Lymph # 2.0 1.0 - 4.8 K/uL    Mono # 0.6 0.3 - 1.0 K/uL    Eos # 0.3 0.0 - 0.5 K/uL    Baso # 0.02 0.00 - 0.20 K/uL    nRBC 0 0 /100 WBC    Gran % 69.0 38.0 - 73.0 %    Lymph % 21.8 18.0 - 48.0 %    Mono % 6.0 4.0 - 15.0 %    Eosinophil % 2.8 0.0 - 8.0 %    Basophil % 0.2 0.0 - 1.9 %    Differential Method Automated    Comprehensive Metabolic Panel (CMP)    Collection Time: " "06/05/23  9:59 PM   Result Value Ref Range    Sodium 138 136 - 145 mmol/L    Potassium 3.9 3.5 - 5.1 mmol/L    Chloride 102 95 - 110 mmol/L    CO2 23 23 - 29 mmol/L    Glucose 83 70 - 110 mg/dL    BUN 11 6 - 20 mg/dL    Creatinine 0.7 0.5 - 1.4 mg/dL    Calcium 9.6 8.7 - 10.5 mg/dL    Total Protein 7.3 6.0 - 8.4 g/dL    Albumin 3.2 (L) 3.5 - 5.2 g/dL    Total Bilirubin 0.3 0.1 - 1.0 mg/dL    Alkaline Phosphatase 86 55 - 135 U/L    AST 18 10 - 40 U/L    ALT 18 10 - 44 U/L    Anion Gap 13 8 - 16 mmol/L    eGFR >60.0 >60 mL/min/1.73 m^2         Significant Imaging: I have reviewed all pertinent imaging results/findings within the past 24 hours.            Assessment/Plan:     * Acute bacterial pharyngitis  CT shows just stranding and inflammation, but no abscess  In the ED she got Decadron 10mg iv x 1 and Zosyn 4.5g  National shortage of Clindamycin, so blocked out of ordering  Started Unasyn  NPO and consult ENT  GAS molecular is positive         ampicillin-sulbactam (UNASYN) 3 g in sodium chloride 0.9 % 100 mL IVPB (MB+), 3 g, Intravenous, Q6H     Postoperative infection  She is also followed by ENT for hypertrophy of nasal turbinates, upper respiratory tract obstruction, and chronic tonsillitis and adenoiditis.    She was admitted by ENT, Dr. Costa on 6/2/23 and underwent EXCISION, NASAL TURBINATE, SUBMUCOSAL (Bilateral) and TONSILLECTOMY AND ADENOIDECTOMY (Bilateral) that same day on Cefdinir.  She had to return to the ED on 6/4/23 due to new onset of fevers to 102.8F with a WBC of 20.  Her GAS molecular test was positive in the ED.  Dr. Melendez noted, "She is currently taking an oral antibiotic, but has had only 3 doses.  I think it is prudent to administer Bicillin.  She was given 1.2 million units IM."      Acute postoperative anemia due to expected blood loss  Stable to monitor     Latest Reference Range & Units 05/31/23 12:47 06/04/23 03:04 06/05/23 21:59   Hemoglobin 12.0 - 16.0 g/dL 13.4 12.3 11.5 (L)    "       Anxiety and depression  Appears stable  Follow up with PCP      Obesity (BMI 35.0-39.9 without comorbidity)  Encourage healthy diet and exercise      Attention deficit hyperactivity disorder  Stable to follow up with PCP        VTE Risk Mitigation (From admission, onward)         Ordered     Place LAURENT hose  Until discontinued         06/06/23 0405     Reason for No Pharmacological VTE Prophylaxis  Once        Question:  Reasons:  Answer:  Physician Provided (leave comment)  Comment:  May need ENT procedure today    06/06/23 0405     IP VTE HIGH RISK PATIENT  Once         06/06/23 0405     Place sequential compression device  Until discontinued         06/06/23 0405                     The attending portion of this evaluation, treatment, and documentation was performed per ALAN Richardson MD via Telemedicine AudioVisual using the secure [employer] software platform with 2 way audio/video. The provider was located off-site and the patient is located in the hospital. The aforementioned video software was utilized to document the relevant history and physical exam    On 06/06/2023, patient should be placed in hospital observation services under my care.        ALAN Richardson MD  Department of Hospital Medicine   [place] - Emergency Dept

## 2023-06-06 NOTE — ASSESSMENT & PLAN NOTE
CT shows just stranding and inflammation, but no abscess  In the ED she got Decadron 10mg iv x 1 and Zosyn 4.5g  National shortage of Clindamycin, so blocked out of ordering  Started Unasyn  NPO and consult ENT  GAS molecular is positive         ampicillin-sulbactam (UNASYN) 3 g in sodium chloride 0.9 % 100 mL IVPB (MB+), 3 g, Intravenous, Q6H

## 2023-06-06 NOTE — PLAN OF CARE
Dr. Sanjay Thapa's office would not schedule a hospital follow up appointment due to being an OB/GYN doctor. Patient previously refused to give a PCP doctor stating that her OB is who she sees for everything.

## 2023-06-06 NOTE — SUBJECTIVE & OBJECTIVE
Past Medical History:   Diagnosis Date    Anxiety     Asthma     Depression     Herpes simplex virus (HSV) infection     Obesity, unspecified        Past Surgical History:   Procedure Laterality Date    COLONOSCOPY N/A 05/27/2020    Procedure: COLONOSCOPY;  Surgeon: Bunny Eaton MD;  Location: [facility];  Service: Endoscopy;  Laterality: N/A;    COLONOSCOPY         Review of patient's allergies indicates:  No Known Allergies    No current facility-administered medications on file prior to encounter.     Current Outpatient Medications on File Prior to Encounter   Medication Sig    albuterol (PROVENTIL/VENTOLIN HFA) 90 mcg/actuation inhaler Inhale 2 puffs into the lungs every 4 (four) hours as needed.    FLUoxetine 20 MG capsule Take 20 mg by mouth once daily.    PNV cmb#95-ferrous fumarate-FA (PRENATAL) 28 mg iron- 800 mcg Tab Take 1 tablet by mouth once daily.    valACYclovir (VALTREX) 500 MG tablet Take 500 mg by mouth.    VYVANSE 50 mg capsule Take 50 mg by mouth every morning.     Family History       Problem Relation (Age of Onset)    Heart disease Paternal Grandmother    Thyroid disease Mother          Tobacco Use    Smoking status: Some Days     Types: Vaping with nicotine    Smokeless tobacco: Never   Substance and Sexual Activity    Alcohol use: Not Currently    Drug use: Never    Sexual activity: Yes     Partners: Male     Review of Systems   Constitutional:  Positive for activity change, appetite change, chills, fatigue and fever.   HENT:  Positive for facial swelling, sore throat and voice change.    Respiratory:  Negative for shortness of breath.    Cardiovascular:  Negative for chest pain.   Gastrointestinal:  Negative for nausea and vomiting.   Endocrine: Positive for cold intolerance.   Genitourinary:  Negative for dysuria.   Musculoskeletal:  Positive for myalgias.   Skin:  Positive for color change and rash.   Neurological:  Positive for headaches.   Hematological:  Does not bruise/bleed easily.  "  Psychiatric/Behavioral:  Positive for sleep disturbance.    Objective:     Vital Signs (Most Recent):  Temp: 98.5 °F (36.9 °C) (last took pain med with tylenol around 1730) (06/05/23 2035)  Pulse: 71 (06/05/23 2206)  Resp: 19 (06/05/23 2212)  BP: (!) 106/59 (06/05/23 2330)  SpO2: 98 % (06/05/23 2330) Vital Signs (24h Range):  Temp:  [98.5 °F (36.9 °C)] 98.5 °F (36.9 °C)  Pulse:  [71-81] 71  Resp:  [14-19] 19  SpO2:  [98 %-100 %] 98 %  BP: (106-113)/(59-73) 106/59     Weight: 106.6 kg (235 lb)  Body mass index is 36.81 kg/m².     Physical Exam  Constitutional:       General: She is not in acute distress.     Appearance: She is obese. She is ill-appearing. She is not toxic-appearing or diaphoretic.   HENT:      Head: No right periorbital erythema or left periorbital erythema.      Jaw: Swelling and pain on movement present.   Neck:      Comments: Right submandibular area is swollen with a patch of erythema in a triangular shape.  On self-palpation on camera, the patient reports the skin being, "tense and painful"  Pulmonary:      Effort: No tachypnea or bradypnea.   Abdominal:      General: Abdomen is protuberant.   Genitourinary:     Comments: No gaines in place  Neurological:      Mental Status: She is oriented to person, place, and time. She is lethargic.      GCS: GCS eye subscore is 4. GCS verbal subscore is 5. GCS motor subscore is 6.   Psychiatric:         Attention and Perception: Attention and perception normal.         Mood and Affect: Mood is depressed. Affect is flat.         Cognition and Memory: Cognition and memory normal.              Significant Labs: All pertinent labs within the past 24 hours have been reviewed.  Recent Results (from the past 24 hour(s))   Complete Blood Count (CBC)    Collection Time: 06/05/23  9:59 PM   Result Value Ref Range    WBC 9.22 3.90 - 12.70 K/uL    RBC 4.00 4.00 - 5.40 M/uL    Hemoglobin 11.5 (L) 12.0 - 16.0 g/dL    Hematocrit 34.3 (L) 37.0 - 48.5 %    MCV 86 82 - 98 fL "    MCH 28.8 27.0 - 31.0 pg    MCHC 33.5 32.0 - 36.0 g/dL    RDW 12.4 11.5 - 14.5 %    Platelets 232 150 - 450 K/uL    MPV 11.0 9.2 - 12.9 fL    Immature Granulocytes 0.2 0.0 - 0.5 %    Gran # (ANC) 6.4 1.8 - 7.7 K/uL    Immature Grans (Abs) 0.02 0.00 - 0.04 K/uL    Lymph # 2.0 1.0 - 4.8 K/uL    Mono # 0.6 0.3 - 1.0 K/uL    Eos # 0.3 0.0 - 0.5 K/uL    Baso # 0.02 0.00 - 0.20 K/uL    nRBC 0 0 /100 WBC    Gran % 69.0 38.0 - 73.0 %    Lymph % 21.8 18.0 - 48.0 %    Mono % 6.0 4.0 - 15.0 %    Eosinophil % 2.8 0.0 - 8.0 %    Basophil % 0.2 0.0 - 1.9 %    Differential Method Automated    Comprehensive Metabolic Panel (CMP)    Collection Time: 06/05/23  9:59 PM   Result Value Ref Range    Sodium 138 136 - 145 mmol/L    Potassium 3.9 3.5 - 5.1 mmol/L    Chloride 102 95 - 110 mmol/L    CO2 23 23 - 29 mmol/L    Glucose 83 70 - 110 mg/dL    BUN 11 6 - 20 mg/dL    Creatinine 0.7 0.5 - 1.4 mg/dL    Calcium 9.6 8.7 - 10.5 mg/dL    Total Protein 7.3 6.0 - 8.4 g/dL    Albumin 3.2 (L) 3.5 - 5.2 g/dL    Total Bilirubin 0.3 0.1 - 1.0 mg/dL    Alkaline Phosphatase 86 55 - 135 U/L    AST 18 10 - 40 U/L    ALT 18 10 - 44 U/L    Anion Gap 13 8 - 16 mmol/L    eGFR >60.0 >60 mL/min/1.73 m^2         Significant Imaging: I have reviewed all pertinent imaging results/findings within the past 24 hours.

## 2023-06-07 VITALS
WEIGHT: 233.25 LBS | RESPIRATION RATE: 17 BRPM | BODY MASS INDEX: 36.61 KG/M2 | HEIGHT: 67 IN | SYSTOLIC BLOOD PRESSURE: 108 MMHG | DIASTOLIC BLOOD PRESSURE: 63 MMHG | OXYGEN SATURATION: 98 % | TEMPERATURE: 98 F | HEART RATE: 54 BPM

## 2023-06-07 LAB
ANION GAP SERPL CALC-SCNC: 10 MMOL/L (ref 8–16)
BASOPHILS # BLD AUTO: 0 K/UL (ref 0–0.2)
BASOPHILS NFR BLD: 0 % (ref 0–1.9)
BUN SERPL-MCNC: 10 MG/DL (ref 6–20)
CALCIUM SERPL-MCNC: 9.1 MG/DL (ref 8.7–10.5)
CHLORIDE SERPL-SCNC: 109 MMOL/L (ref 95–110)
CO2 SERPL-SCNC: 23 MMOL/L (ref 23–29)
CREAT SERPL-MCNC: 0.6 MG/DL (ref 0.5–1.4)
DIFFERENTIAL METHOD: ABNORMAL
EOSINOPHIL # BLD AUTO: 0 K/UL (ref 0–0.5)
EOSINOPHIL NFR BLD: 0 % (ref 0–8)
ERYTHROCYTE [DISTWIDTH] IN BLOOD BY AUTOMATED COUNT: 12.1 % (ref 11.5–14.5)
EST. GFR  (NO RACE VARIABLE): >60 ML/MIN/1.73 M^2
FINAL PATHOLOGIC DIAGNOSIS: NORMAL
GLUCOSE SERPL-MCNC: 93 MG/DL (ref 70–110)
GROSS: NORMAL
HCT VFR BLD AUTO: 33.5 % (ref 37–48.5)
HGB BLD-MCNC: 11.4 G/DL (ref 12–16)
IMM GRANULOCYTES # BLD AUTO: 0.02 K/UL (ref 0–0.04)
IMM GRANULOCYTES NFR BLD AUTO: 0.3 % (ref 0–0.5)
LYMPHOCYTES # BLD AUTO: 1.8 K/UL (ref 1–4.8)
LYMPHOCYTES NFR BLD: 25 % (ref 18–48)
Lab: NORMAL
MAGNESIUM SERPL-MCNC: 1.9 MG/DL (ref 1.6–2.6)
MCH RBC QN AUTO: 28.9 PG (ref 27–31)
MCHC RBC AUTO-ENTMCNC: 34 G/DL (ref 32–36)
MCV RBC AUTO: 85 FL (ref 82–98)
MONOCYTES # BLD AUTO: 0.3 K/UL (ref 0.3–1)
MONOCYTES NFR BLD: 4.8 % (ref 4–15)
NEUTROPHILS # BLD AUTO: 5 K/UL (ref 1.8–7.7)
NEUTROPHILS NFR BLD: 69.9 % (ref 38–73)
NRBC BLD-RTO: 0 /100 WBC
PHOSPHATE SERPL-MCNC: 2.8 MG/DL (ref 2.7–4.5)
PLATELET # BLD AUTO: 284 K/UL (ref 150–450)
PMV BLD AUTO: 10.7 FL (ref 9.2–12.9)
POTASSIUM SERPL-SCNC: 3.7 MMOL/L (ref 3.5–5.1)
RBC # BLD AUTO: 3.94 M/UL (ref 4–5.4)
SODIUM SERPL-SCNC: 142 MMOL/L (ref 136–145)
WBC # BLD AUTO: 7.13 K/UL (ref 3.9–12.7)

## 2023-06-07 PROCEDURE — 80048 BASIC METABOLIC PNL TOTAL CA: CPT | Performed by: INTERNAL MEDICINE

## 2023-06-07 PROCEDURE — 85025 COMPLETE CBC W/AUTO DIFF WBC: CPT | Performed by: INTERNAL MEDICINE

## 2023-06-07 PROCEDURE — 99238 HOSP IP/OBS DSCHRG MGMT 30/<: CPT | Mod: ,,, | Performed by: STUDENT IN AN ORGANIZED HEALTH CARE EDUCATION/TRAINING PROGRAM

## 2023-06-07 PROCEDURE — 94761 N-INVAS EAR/PLS OXIMETRY MLT: CPT

## 2023-06-07 PROCEDURE — 99900035 HC TECH TIME PER 15 MIN (STAT)

## 2023-06-07 PROCEDURE — G0378 HOSPITAL OBSERVATION PER HR: HCPCS

## 2023-06-07 PROCEDURE — 84100 ASSAY OF PHOSPHORUS: CPT | Performed by: INTERNAL MEDICINE

## 2023-06-07 PROCEDURE — 63600175 PHARM REV CODE 636 W HCPCS: Performed by: INTERNAL MEDICINE

## 2023-06-07 PROCEDURE — 25000003 PHARM REV CODE 250: Performed by: INTERNAL MEDICINE

## 2023-06-07 PROCEDURE — 83735 ASSAY OF MAGNESIUM: CPT | Performed by: INTERNAL MEDICINE

## 2023-06-07 PROCEDURE — 96366 THER/PROPH/DIAG IV INF ADDON: CPT

## 2023-06-07 PROCEDURE — 25000003 PHARM REV CODE 250: Performed by: STUDENT IN AN ORGANIZED HEALTH CARE EDUCATION/TRAINING PROGRAM

## 2023-06-07 PROCEDURE — 99238 PR HOSPITAL DISCHARGE DAY,<30 MIN: ICD-10-PCS | Mod: ,,, | Performed by: STUDENT IN AN ORGANIZED HEALTH CARE EDUCATION/TRAINING PROGRAM

## 2023-06-07 PROCEDURE — 96376 TX/PRO/DX INJ SAME DRUG ADON: CPT

## 2023-06-07 RX ORDER — HYDROCODONE BITARTRATE AND ACETAMINOPHEN 10; 325 MG/1; MG/1
1 TABLET ORAL EVERY 6 HOURS PRN
Status: DISCONTINUED | OUTPATIENT
Start: 2023-06-07 | End: 2023-06-07 | Stop reason: HOSPADM

## 2023-06-07 RX ADMIN — AMPICILLIN SODIUM AND SULBACTAM SODIUM 3 G: 2; 1 INJECTION, POWDER, FOR SOLUTION INTRAMUSCULAR; INTRAVENOUS at 09:06

## 2023-06-07 RX ADMIN — HYDROCODONE BITARTRATE AND ACETAMINOPHEN 1 TABLET: 10; 325 TABLET ORAL at 09:06

## 2023-06-07 RX ADMIN — AMPICILLIN SODIUM AND SULBACTAM SODIUM 3 G: 2; 1 INJECTION, POWDER, FOR SOLUTION INTRAMUSCULAR; INTRAVENOUS at 04:06

## 2023-06-07 RX ADMIN — OXYCODONE AND ACETAMINOPHEN 1 TABLET: 7.5; 325 TABLET ORAL at 04:06

## 2023-06-07 NOTE — HOSPITAL COURSE
Patient admitted and started on IV antibiotics. Evaluated by ENT. Patient improved markedly on IV abx. Cleared for discharge by ENT who advised patient to come immediately to clinic following discharge for evaluation and medication prescriptions. Patient provided discharge instructions and return precautions.

## 2023-06-07 NOTE — PLAN OF CARE
Problem: Gas Exchange Impaired  Goal: Optimal Gas Exchange  Outcome: Ongoing, Progressing  Intervention: Optimize Oxygenation and Ventilation  Flowsheets (Taken 6/7/2023 0416)  Airway/Ventilation Management: airway patency maintained  Head of Bed (HOB) Positioning: HOB elevated   Patient in no apparent distress. Sat's 98  % on room air. PRN treatments not needed . Will continue to monitor.

## 2023-06-07 NOTE — NURSING
D/C instructions provided, understanding of D/C instructions verbalized. IV removed, catheter intact. Tolerated well. VSS. Pt denies complaints. Waiting for transportation.

## 2023-06-07 NOTE — PLAN OF CARE
Problem: Adult Inpatient Plan of Care  Goal: Plan of Care Review  Outcome: Ongoing, Progressing  Goal: Patient-Specific Goal (Individualized)  Outcome: Ongoing, Progressing  Goal: Absence of Hospital-Acquired Illness or Injury  Outcome: Ongoing, Progressing  Goal: Optimal Comfort and Wellbeing  Outcome: Ongoing, Progressing  Goal: Readiness for Transition of Care  Outcome: Ongoing, Progressing     Problem: Pain Acute  Goal: Acceptable Pain Control and Functional Ability  Outcome: Ongoing, Progressing     Problem: Skin or Soft Tissue Infection  Goal: Absence of Infection Signs and Symptoms  Outcome: Ongoing, Progressing   POC reviewed at bedside. Questions and concerns addressed. VSS. Placed bed in low and locked position. Call light within reach. Side rails up x2. Instructed to call for any needs. Verbalized understanding of all instructions. Frequent rounds.

## 2023-06-07 NOTE — PLAN OF CARE
[facility] - Intensive Care  Discharge Final Note    Primary Care Provider: Sanjay Thapa MD    Expected Discharge Date: 6/7/2023    Verbal follow up appointment with Renea Altamirano NP for PCP provided to patient. Spoke to Dr Costa who states to have her come by the hospital when she leaves. Patient notified. Denies any other needs at this time.      Final Discharge Note (most recent)       Final Note - 06/07/23 1030          Final Note    Assessment Type Final Discharge Note     Anticipated Discharge Disposition Home or Self Care     What phone number can be called within the next 1-3 days to see how you are doing after discharge? [phone]     Hospital Resources/Appts/Education Provided Appointments scheduled and added to AVS        Post-Acute Status    Discharge Delays None known at this time                     Important Message from Medicare             Contact Info       Sanjay Thapa MD   Specialty: Obstetrics, Obstetrics and Gynecology   Relationship: PCP - General    [address] [place] [address]  [facility] [address]   Phone: [phone]       Next Steps: Follow up    Instructions: Dr. Thapa's office would not schedule hospital follow up appointment due to being an OB/GYN doctor.    Víctor Costa MD   Specialty: Otolaryngology    [address] [facility] [place] [address]   Phone: [phone]       Next Steps: Follow up    Instructions: go straight to his office when leaves hospital    Renea Altamirano NP   Specialty: Family Medicine    [address]  [place] [address]   Phone: [phone]       Next Steps: Go on 6/12/2023    Instructions: appointment Monday June 12th at 10:30am for primary care follow up

## 2023-06-09 NOTE — DISCHARGE SUMMARY
"[facility] Medicine  Discharge Summary      Patient Name: Tatiana Cross  MRN: [id number]  MARIA E: [id number]  Patient Class: OP- Observation  Admission Date: 6/5/2023  Hospital Length of Stay: 0 days  Discharge Date and Time: 6/7/2023 11:00 AM  Attending Physician: No att. providers found   Discharging Provider: Kwabena Pozo MD  Primary Care Provider: Sanjay Thapa MD    Primary Care Team: Networked reference to record PCT     HPI:   Ms. Cross is a 16yo lady with a past medical history of anxiety, depression, asthma, and obesity.  She is also followed by ENT for hypertrophy of nasal turbinates, upper respiratory tract obstruction, and chronic tonsillitis and adenoiditis.    She was admitted by ENT, Dr. Costa on 6/2/23 and underwent EXCISION, NASAL TURBINATE, SUBMUCOSAL (Bilateral) and TONSILLECTOMY AND ADENOIDECTOMY (Bilateral) that same day on Cefdinir.  She had to return to the ED on 6/4/23 due to new onset of fevers to 102.8F with a WBC of 20.  Her GAS molecular test was positive in the ED.  Dr. Melendez noted, "She is currently taking an oral antibiotic, but has had only 3 doses.  I think it is prudent to administer Bicillin.  She was given 1.2 million units IM."    Her mother now brings her back to the ED due to worsening fevers at home (apparently up to 105.6F) with submandibular swelling and redness that has worsened since her last ED visit.  She denies swallowing difficulties, or symptoms of stridor.  She does have some mild sore throat, but improved.  Her mother notes a large area of swelling and bright redness to her right submandibular area that she traced out in a triangle shape.  This redness began late Monday night after leaving the ED.  The patient also feels the right lower submandibular area is the source of most of her pain as well.  Her other complaints are profound fatigue, malaise, fevers, and headache.  She denies N/V/D.        In the ED her VS were BP (!) 106/59 " "  Pulse 71   Temp 98.5 °F (36.9 °C) (Oral) Comment: last took pain med with tylenol around 1730  Resp 19   Ht 5' 7" (1.702 m)   Wt 106.6 kg (235 lb)   LMP 08/12/2022 (Exact Date) Comment: hcg negative  SpO2 98%   Breastfeeding No   BMI 36.81 kg/m².  Labs showed WBC 9, Hg 11.5, Cr 0.7.    CT neck with contrast showed mild prominence of the bilateral submandibular glands with inflammatory stranding slightly more prominent on the right.  No abscess.    In the ED she was treated with:  Medications   piperacillin-tazobactam (ZOSYN) 4.5 g in dextrose 5 % in water (D5W) 5 % 100 mL IVPB (MB+) (0 g Intravenous Stopped 6/5/23 2241)   sodium chloride 0.9% bolus 1,000 mL 1,000 mL (0 mLs Intravenous Stopped 6/5/23 2316)   dexAMETHasone sodium phos (PF) injection 10 mg (10 mg Intravenous Given 6/5/23 2212)   morphine injection 4 mg (4 mg Intravenous Given 6/5/23 2212)   ondansetron injection 4 mg (4 mg Intravenous Given 6/5/23 2212)   diphenhydrAMINE injection 25 mg (25 mg Intravenous Given 6/5/23 2244)   iohexoL (OMNIPAQUE 350) injection 75 mL (75 mLs Intravenous Given 6/6/23 0209)                   * No surgery found *      Hospital Course:   Patient admitted and started on IV antibiotics. Evaluated by ENT. Patient improved markedly on IV abx. Cleared for discharge by ENT who advised patient to come immediately to clinic following discharge for evaluation and medication prescriptions. Patient provided discharge instructions and return precautions.       Goals of Care Treatment Preferences:  Code Status: Full Code      Consults:     No new Assessment & Plan notes have been filed under this hospital service since the last note was generated.  Service: Hospital Medicine    Final Active Diagnoses:    Diagnosis Date Noted POA    PRINCIPAL PROBLEM:  Acute bacterial pharyngitis [J02.8, B96.89] 06/06/2023 Yes    Anxiety and depression [F41.9, F32.A] 06/06/2023 Yes    Postoperative infection [T81.40XA] 06/06/2023 Yes    " Acute postoperative anemia due to expected blood loss [D62] 06/06/2023 Yes    Obesity (BMI 35.0-39.9 without comorbidity) [E66.9] 11/08/2021 Yes    Attention deficit hyperactivity disorder [F90.9] 04/07/2016 Yes      Problems Resolved During this Admission:       Discharged Condition: good    Disposition: Short Term Hospital    Follow Up:   Follow-up Information     Sanjay Thapa MD Follow up.    Specialties: Obstetrics, Obstetrics and Gynecology  Why: Dr. Thapa's office would not schedule hospital follow up appointment due to being an OB/GYN doctor.  Contact information:  [address]  [place] [facility] [address]  [phone]             Víctor Costa MD Follow up.    Specialty: Otolaryngology  Why: go straight to his office when leaves hospital  Contact information:  [address] [address]  [phone]             Renea E. McDay, NP. Go on 6/12/2023.    Specialty: Family Medicine  Why: appointment Monday June 12th at 10:30am for primary care follow up  Contact information:  [address]  [place] [address]  [phone]                       Patient Instructions:      Diet full liquid     Notify your health care provider if you experience any of the following:  temperature >100.4     Notify your health care provider if you experience any of the following:  severe uncontrolled pain     Notify your health care provider if you experience any of the following:  redness, tenderness, or signs of infection (pain, swelling, redness, odor or green/yellow discharge around incision site)     Activity as tolerated       Significant Diagnostic Studies:   Recent Results (from the past 168 hour(s))   Specimen to Pathology, Surgery ENT    Collection Time: 06/02/23 10:08 AM   Result Value Ref Range    Final Pathologic Diagnosis       1. LEFT TONSIL, TONSILLECTOMY:  -  Chronic tonsillitis.    -  No evidence of malignancy.      2. RIGHT TONSIL,  TONSILLECTOMY:  -  Chronic tonsillitis.    -  No evidence of malignancy.      Gross       Number of containers:  2     Part 1  Container Label: Clinic Number/AP Number:  [id number] / [id number], and &quot;left tonsil&quot;  Received in formalin is a 4.6 g, 3.6 x 2.8 x 1.2 cm aggregate of soft tan-pink lobulated tissue fragments.  The cut surface is tan-pink and lobulated.  No distinct lesions are identified.  Representative sections are submitted in XRL--1-A.    Part 2   Container Label: Clinic Number/AP Number:  [id number] / [id number], and &quot; right tonsil&quot;  Received in formalin is a 3.8 g, 3.0 x 2.5 x 1.7 cm ovoid soft tan-pink tonsil.  The outer surface is predominantly smooth and glistening with focal hemorrhage and a vaguely cerebriform appearance.  Serial sections reveal a tan-pink lobulated cut surface   with no distinct lesions identified.  Representative sections are submitted in IYI--2-A.    Ro Grimaldo, P.A.         Disclaimer       Unless the case is a 'gross only' or additional testing only, the final diagnosis for each specimen is based on a microscopic examination of appropriate tissue sections.   CBC auto differential    Collection Time: 06/04/23  3:04 AM   Result Value Ref Range    WBC 20.60 (H) 3.90 - 12.70 K/uL    RBC 4.27 4.00 - 5.40 M/uL    Hemoglobin 12.3 12.0 - 16.0 g/dL    Hematocrit 36.6 (L) 37.0 - 48.5 %    MCV 86 82 - 98 fL    MCH 28.8 27.0 - 31.0 pg    MCHC 33.6 32.0 - 36.0 g/dL    RDW 12.5 11.5 - 14.5 %    Platelets 257 150 - 450 K/uL    MPV 10.4 9.2 - 12.9 fL    Immature Granulocytes 0.6 (H) 0.0 - 0.5 %    Gran # (ANC) 18.4 (H) 1.8 - 7.7 K/uL    Immature Grans (Abs) 0.13 (H) 0.00 - 0.04 K/uL    Lymph # 1.2 1.0 - 4.8 K/uL    Mono # 0.8 0.3 - 1.0 K/uL    Eos # 0.0 0.0 - 0.5 K/uL    Baso # 0.03 0.00 - 0.20 K/uL    nRBC 0 0 /100 WBC    Gran % 89.4 (H) 38.0 - 73.0 %    Lymph % 5.9 (L) 18.0 - 48.0 %    Mono % 4.0 4.0 - 15.0 %    Eosinophil % 0.0 0.0 - 8.0 %    Basophil % 0.1 0.0 -  1.9 %    Differential Method Automated    Comprehensive metabolic panel    Collection Time: 06/04/23  3:04 AM   Result Value Ref Range    Sodium 138 136 - 145 mmol/L    Potassium 3.4 (L) 3.5 - 5.1 mmol/L    Chloride 105 95 - 110 mmol/L    CO2 19 (L) 23 - 29 mmol/L    Glucose 99 70 - 110 mg/dL    BUN 11 6 - 20 mg/dL    Creatinine 0.8 0.5 - 1.4 mg/dL    Calcium 9.5 8.7 - 10.5 mg/dL    Total Protein 7.2 6.0 - 8.4 g/dL    Albumin 3.5 3.5 - 5.2 g/dL    Total Bilirubin 0.6 0.1 - 1.0 mg/dL    Alkaline Phosphatase 90 55 - 135 U/L    AST 17 10 - 40 U/L    ALT 17 10 - 44 U/L    Anion Gap 14 8 - 16 mmol/L    eGFR >60.0 >60 mL/min/1.73 m^2   Heterophile Ab Screen    Collection Time: 06/04/23  3:04 AM   Result Value Ref Range    Monospot Negative Non-reactive   COVID-19 Rapid Screening    Collection Time: 06/04/23  3:11 AM   Result Value Ref Range    SARS-CoV-2 RNA, Amplification, Qual Negative Negative   Group A Strep, Molecular    Collection Time: 06/04/23  3:11 AM    Specimen: Throat   Result Value Ref Range    Group A Strep, Molecular Positive (A) Negative   Complete Blood Count (CBC)    Collection Time: 06/05/23  9:59 PM   Result Value Ref Range    WBC 9.22 3.90 - 12.70 K/uL    RBC 4.00 4.00 - 5.40 M/uL    Hemoglobin 11.5 (L) 12.0 - 16.0 g/dL    Hematocrit 34.3 (L) 37.0 - 48.5 %    MCV 86 82 - 98 fL    MCH 28.8 27.0 - 31.0 pg    MCHC 33.5 32.0 - 36.0 g/dL    RDW 12.4 11.5 - 14.5 %    Platelets 232 150 - 450 K/uL    MPV 11.0 9.2 - 12.9 fL    Immature Granulocytes 0.2 0.0 - 0.5 %    Gran # (ANC) 6.4 1.8 - 7.7 K/uL    Immature Grans (Abs) 0.02 0.00 - 0.04 K/uL    Lymph # 2.0 1.0 - 4.8 K/uL    Mono # 0.6 0.3 - 1.0 K/uL    Eos # 0.3 0.0 - 0.5 K/uL    Baso # 0.02 0.00 - 0.20 K/uL    nRBC 0 0 /100 WBC    Gran % 69.0 38.0 - 73.0 %    Lymph % 21.8 18.0 - 48.0 %    Mono % 6.0 4.0 - 15.0 %    Eosinophil % 2.8 0.0 - 8.0 %    Basophil % 0.2 0.0 - 1.9 %    Differential Method Automated    Comprehensive Metabolic Panel (CMP)    Collection  Time: 06/05/23  9:59 PM   Result Value Ref Range    Sodium 138 136 - 145 mmol/L    Potassium 3.9 3.5 - 5.1 mmol/L    Chloride 102 95 - 110 mmol/L    CO2 23 23 - 29 mmol/L    Glucose 83 70 - 110 mg/dL    BUN 11 6 - 20 mg/dL    Creatinine 0.7 0.5 - 1.4 mg/dL    Calcium 9.6 8.7 - 10.5 mg/dL    Total Protein 7.3 6.0 - 8.4 g/dL    Albumin 3.2 (L) 3.5 - 5.2 g/dL    Total Bilirubin 0.3 0.1 - 1.0 mg/dL    Alkaline Phosphatase 86 55 - 135 U/L    AST 18 10 - 40 U/L    ALT 18 10 - 44 U/L    Anion Gap 13 8 - 16 mmol/L    eGFR >60.0 >60 mL/min/1.73 m^2   HIV 1/2 Ag/Ab (4th Gen)    Collection Time: 06/05/23  9:59 PM   Result Value Ref Range    HIV 1/2 Ag/Ab Non-reactive Non-reactive   Hepatitis C Antibody    Collection Time: 06/05/23  9:59 PM   Result Value Ref Range    Hepatitis C Ab Non-reactive Non-reactive   CBC auto differential    Collection Time: 06/06/23  6:30 AM   Result Value Ref Range    WBC 7.64 3.90 - 12.70 K/uL    RBC 4.34 4.00 - 5.40 M/uL    Hemoglobin 12.6 12.0 - 16.0 g/dL    Hematocrit 37.3 37.0 - 48.5 %    MCV 86 82 - 98 fL    MCH 29.0 27.0 - 31.0 pg    MCHC 33.8 32.0 - 36.0 g/dL    RDW 12.1 11.5 - 14.5 %    Platelets 295 150 - 450 K/uL    MPV 11.5 9.2 - 12.9 fL    Immature Granulocytes 0.4 0.0 - 0.5 %    Gran # (ANC) 6.9 1.8 - 7.7 K/uL    Immature Grans (Abs) 0.03 0.00 - 0.04 K/uL    Lymph # 0.6 (L) 1.0 - 4.8 K/uL    Mono # 0.1 (L) 0.3 - 1.0 K/uL    Eos # 0.0 0.0 - 0.5 K/uL    Baso # 0.01 0.00 - 0.20 K/uL    nRBC 0 0 /100 WBC    Gran % 90.4 (H) 38.0 - 73.0 %    Lymph % 8.2 (L) 18.0 - 48.0 %    Mono % 0.9 (L) 4.0 - 15.0 %    Eosinophil % 0.0 0.0 - 8.0 %    Basophil % 0.1 0.0 - 1.9 %    Differential Method Automated    Basic metabolic panel    Collection Time: 06/06/23  6:30 AM   Result Value Ref Range    Sodium 137 136 - 145 mmol/L    Potassium 4.5 3.5 - 5.1 mmol/L    Chloride 105 95 - 110 mmol/L    CO2 20 (L) 23 - 29 mmol/L    Glucose 126 (H) 70 - 110 mg/dL    BUN 11 6 - 20 mg/dL    Creatinine 0.6 0.5 - 1.4  mg/dL    Calcium 9.8 8.7 - 10.5 mg/dL    Anion Gap 12 8 - 16 mmol/L    eGFR >60.0 >60 mL/min/1.73 m^2   Phosphorus    Collection Time: 06/06/23  6:30 AM   Result Value Ref Range    Phosphorus 3.6 2.7 - 4.5 mg/dL   Magnesium    Collection Time: 06/06/23  6:30 AM   Result Value Ref Range    Magnesium 2.0 1.6 - 2.6 mg/dL   Basic Metabolic Panel (BMP)    Collection Time: 06/07/23  4:28 AM   Result Value Ref Range    Sodium 142 136 - 145 mmol/L    Potassium 3.7 3.5 - 5.1 mmol/L    Chloride 109 95 - 110 mmol/L    CO2 23 23 - 29 mmol/L    Glucose 93 70 - 110 mg/dL    BUN 10 6 - 20 mg/dL    Creatinine 0.6 0.5 - 1.4 mg/dL    Calcium 9.1 8.7 - 10.5 mg/dL    Anion Gap 10 8 - 16 mmol/L    eGFR >60.0 >60 mL/min/1.73 m^2   Magnesium    Collection Time: 06/07/23  4:28 AM   Result Value Ref Range    Magnesium 1.9 1.6 - 2.6 mg/dL   Phosphorus    Collection Time: 06/07/23  4:28 AM   Result Value Ref Range    Phosphorus 2.8 2.7 - 4.5 mg/dL   CBC with Automated Differential    Collection Time: 06/07/23  4:28 AM   Result Value Ref Range    WBC 7.13 3.90 - 12.70 K/uL    RBC 3.94 (L) 4.00 - 5.40 M/uL    Hemoglobin 11.4 (L) 12.0 - 16.0 g/dL    Hematocrit 33.5 (L) 37.0 - 48.5 %    MCV 85 82 - 98 fL    MCH 28.9 27.0 - 31.0 pg    MCHC 34.0 32.0 - 36.0 g/dL    RDW 12.1 11.5 - 14.5 %    Platelets 284 150 - 450 K/uL    MPV 10.7 9.2 - 12.9 fL    Immature Granulocytes 0.3 0.0 - 0.5 %    Gran # (ANC) 5.0 1.8 - 7.7 K/uL    Immature Grans (Abs) 0.02 0.00 - 0.04 K/uL    Lymph # 1.8 1.0 - 4.8 K/uL    Mono # 0.3 0.3 - 1.0 K/uL    Eos # 0.0 0.0 - 0.5 K/uL    Baso # 0.00 0.00 - 0.20 K/uL    nRBC 0 0 /100 WBC    Gran % 69.9 38.0 - 73.0 %    Lymph % 25.0 18.0 - 48.0 %    Mono % 4.8 4.0 - 15.0 %    Eosinophil % 0.0 0.0 - 8.0 %    Basophil % 0.0 0.0 - 1.9 %    Differential Method Automated      Imaging Results          CT Soft Tissue Neck With Contrast (Final result)  Result time 06/06/23 07:32:28    Final result by Tom Whitman MD (06/06/23 07:32:28)                  Impression:      1. Recent postsurgical changes of bilateral tonsillectomy and adenoidectomy with surrounding circumferential mucosal thickening, parapharyngeal fat stranding and small retropharyngeal effusion, which may be related to recent postoperative changes.  Superimposed infectious pharyngitis not excluded.  No evidence of peritonsillar or retropharyngeal abscess.  2. Right greater than left submandibular/submental soft tissue swelling and inflammatory fat stranding suspicious for cellulitis.  No discrete fluid collection to suggest abscess.  3. Mild prominence of the right greater than left submandibular glands, nonspecific and may reflect sialadenitis, possibly reactive.  The preliminary and final reports are mostly concordant.      Electronically signed by: Tom Whitman  Date:    06/06/2023  Time:    07:32             Narrative:    EXAMINATION:  CT SOFT TISSUE NECK WITH CONTRAST    CLINICAL HISTORY:  Neck abscess, deep tissue;    TECHNIQUE:  Low dose axial images as well as sagittal and coronal reconstructions were performed from the skull base to the clavicles with the administration of intravenous contrast.  75 mL of Omnipaque intravenous contrast were administered.    COMPARISON:  None.    FINDINGS:  Skull Base and Brain (Limited evaluation): No significant abnormalities. Orbits are unremarkable.    Sinus and air cells: Scattered mild mucosal membrane thickening in the paranasal sinuses.  Mastoid air cells are clear.    Oral cavity/Pharynx/Larynx: Postsurgical changes of tonsillectomy and adenoidectomy.  Circumferential mucosal soft tissue thickening around the oropharynx, presumably related to recent postoperative change.  No discrete rim enhancing fluid collection to suggest peritonsillar abscess.  However, there is ill-defined hypoattenuation and fat stranding within the retropharyngeal and parapharyngeal spaces suspicious for an effusion.  No associated mass effect.  Epiglottis and larynx  appear within normal limits.    Parotid and submandibular glands: Ill-defined soft tissue stranding within the right greater than left submandibular spaces surrounding the submandibular glands.  Slight asymmetric enlargement of the right greater than left submandibular glands.  Parotid glands appear within normal limits.    Thyroid: Normal in size and attenuation.  No thyroid nodules noted.    Lymph nodes: Enlarged bilateral upper cervical chain lymph nodes measuring up to 13 mm in short axis dimension (series 2, image 64), nonspecific and may be reactive.  Additional scattered subcentimeter lymph nodes are seen in the neck bilaterally, which do not appear pathologically enlarged or abnormally enhancing.    Vasculature: No significant calcific atherosclerosis or hemodynamically significant stenosis.    Visualized airways and lungs: Airways are patent.  Visualized lung apices are clear and well aerated without consolidation.    Bones: Age-appropriate degeneration.  No acute fracture.    Soft tissues: Soft tissue swelling and scattered fat stranding within the right greater than left submandibular and submental spaces suspicious for cellulitis.  No discrete fluid collection to suggest abscess.                                  Pending Diagnostic Studies:     None         Medications:  Reconciled Home Medications:      Medication List      CONTINUE taking these medications    albuterol 90 mcg/actuation inhaler  Commonly known as: PROVENTIL/VENTOLIN HFA  Inhale 2 puffs into the lungs every 4 (four) hours as needed.     FLUoxetine 20 MG capsule  Take 20 mg by mouth once daily.     PNV cmb#95-ferrous fumarate-FA 28 mg iron- 800 mcg Tab  Commonly known as: PRENATAL  Take 1 tablet by mouth once daily.     valACYclovir 500 MG tablet  Commonly known as: VALTREX  Take 500 mg by mouth.     VYVANSE 50 MG capsule  Generic drug: lisdexamfetamine  Take 50 mg by mouth every morning.            Indwelling Lines/Drains at time of  discharge:   Lines/Drains/Airways     None                 Time spent on the discharge of patient: 15 minutes         Kwabena Pozo MD  Department of [facility] - Intensive Care

## 2023-06-12 ENCOUNTER — TELEPHONE (OUTPATIENT)
Dept: FAMILY MEDICINE | Facility: CLINIC | Age: 18
End: 2023-06-12

## 2023-07-30 ENCOUNTER — HOSPITAL ENCOUNTER (EMERGENCY)
Facility: HOSPITAL | Age: 18
Discharge: HOME | End: 2023-07-31
Attending: EMERGENCY MEDICINE
Payer: COMMERCIAL

## 2023-07-30 DIAGNOSIS — K62.5 RECTAL BLEEDING: Primary | ICD-10-CM

## 2023-07-30 PROCEDURE — 85025 COMPLETE CBC W/AUTO DIFF WBC: CPT | Performed by: EMERGENCY MEDICINE

## 2023-07-30 PROCEDURE — 99283 EMERGENCY DEPT VISIT LOW MDM: CPT

## 2023-07-30 PROCEDURE — 80053 COMPREHEN METABOLIC PANEL: CPT | Performed by: EMERGENCY MEDICINE

## 2023-07-31 VITALS
WEIGHT: 230 LBS | HEIGHT: 67 IN | OXYGEN SATURATION: 98 % | TEMPERATURE: 98 F | HEART RATE: 79 BPM | BODY MASS INDEX: 36.1 KG/M2 | DIASTOLIC BLOOD PRESSURE: 66 MMHG | SYSTOLIC BLOOD PRESSURE: 114 MMHG | RESPIRATION RATE: 18 BRPM

## 2023-07-31 LAB
ALBUMIN SERPL BCP-MCNC: 4.2 G/DL (ref 3.5–5.2)
ALP SERPL-CCNC: 56 U/L (ref 55–135)
ALT SERPL W/O P-5'-P-CCNC: 46 U/L (ref 10–44)
ANION GAP SERPL CALC-SCNC: 7 MMOL/L (ref 8–16)
AST SERPL-CCNC: 33 U/L (ref 10–40)
BASOPHILS # BLD AUTO: 0.03 K/UL (ref 0–0.2)
BASOPHILS NFR BLD: 0.4 % (ref 0–1.9)
BILIRUB SERPL-MCNC: 0.6 MG/DL (ref 0.1–1)
BUN SERPL-MCNC: 14 MG/DL (ref 6–20)
CALCIUM SERPL-MCNC: 9.6 MG/DL (ref 8.7–10.5)
CHLORIDE SERPL-SCNC: 106 MMOL/L (ref 95–110)
CO2 SERPL-SCNC: 25 MMOL/L (ref 23–29)
CREAT SERPL-MCNC: 0.6 MG/DL (ref 0.5–1.4)
DIFFERENTIAL METHOD: ABNORMAL
EOSINOPHIL # BLD AUTO: 0.2 K/UL (ref 0–0.5)
EOSINOPHIL NFR BLD: 2.9 % (ref 0–8)
ERYTHROCYTE [DISTWIDTH] IN BLOOD BY AUTOMATED COUNT: 12.6 % (ref 11.5–14.5)
EST. GFR  (NO RACE VARIABLE): >60 ML/MIN/1.73 M^2
GLUCOSE SERPL-MCNC: 89 MG/DL (ref 70–110)
HCT VFR BLD AUTO: 36.5 % (ref 37–48.5)
HGB BLD-MCNC: 12.3 G/DL (ref 12–16)
IMM GRANULOCYTES # BLD AUTO: 0.01 K/UL (ref 0–0.04)
IMM GRANULOCYTES NFR BLD AUTO: 0.1 % (ref 0–0.5)
LYMPHOCYTES # BLD AUTO: 2.4 K/UL (ref 1–4.8)
LYMPHOCYTES NFR BLD: 34.3 % (ref 18–48)
MCH RBC QN AUTO: 29.1 PG (ref 27–31)
MCHC RBC AUTO-ENTMCNC: 33.7 G/DL (ref 32–36)
MCV RBC AUTO: 86 FL (ref 82–98)
MONOCYTES # BLD AUTO: 0.4 K/UL (ref 0.3–1)
MONOCYTES NFR BLD: 6 % (ref 4–15)
NEUTROPHILS # BLD AUTO: 3.9 K/UL (ref 1.8–7.7)
NEUTROPHILS NFR BLD: 56.3 % (ref 38–73)
NRBC BLD-RTO: 0 /100 WBC
PLATELET # BLD AUTO: 287 K/UL (ref 150–450)
PMV BLD AUTO: 11.4 FL (ref 9.2–12.9)
POTASSIUM SERPL-SCNC: 3.7 MMOL/L (ref 3.5–5.1)
PROT SERPL-MCNC: 7.1 G/DL (ref 6–8.4)
RBC # BLD AUTO: 4.23 M/UL (ref 4–5.4)
SODIUM SERPL-SCNC: 138 MMOL/L (ref 136–145)
WBC # BLD AUTO: 6.86 K/UL (ref 3.9–12.7)

## 2023-07-31 NOTE — ED PROVIDER NOTES
Encounter Date: 7/30/2023       History     Chief Complaint   Patient presents with    Rectal Bleeding     X9 days, large amounts of bright red blood, clots and mucus in stool.    Abdominal Pain     This is a 18-year-old female presenting with complaint bloody diarrhea.  She says she has had this problem intermittently for several years.  She reports a colonoscopy 2-3 years ago that was negative for pathology.  The rectal bleeding has worsened in the last 9 days.  She is had 2-3 episodes daily with large bright red bloody bowel movements.  She has had associated abdominal cramping.  She denies any fever.  She has called and schedule an appointment with a gastroenterologist at [facility] in [place] on 08/11.    The history is provided by the patient.     Review of patient's allergies indicates:  No Known Allergies  Past Medical History:   Diagnosis Date    Anxiety and depression     Asthma     Attention-deficit hyperactivity disorder, unspecified type     Chronic tonsillitis and adenoiditis     Herpes simplex virus (HSV) infection     Hypertrophy of nasal turbinates     Obesity, unspecified     Unspecified hemorrhoids      Past Surgical History:   Procedure Laterality Date    COLONOSCOPY N/A 05/27/2020    Procedure: COLONOSCOPY;  Surgeon: Bunny Eaton MD;  Location: [facility] ENDO;  Service: Endoscopy;  Laterality: N/A;    COLONOSCOPY      EXCISION, NASAL TURBINATE, SUBMUCOSAL Bilateral 6/2/2023    Procedure: EXCISION, NASAL TURBINATE, SUBMUCOSAL;  Surgeon: Víctor Costa MD;  Location: [facility] OR;  Service: ENT;  Laterality: Bilateral;    TONSILLECTOMY, ADENOIDECTOMY Bilateral 6/2/2023    Procedure: TONSILLECTOMY AND ADENOIDECTOMY;  Surgeon: Víctor Costa MD;  Location: [facility] OR;  Service: ENT;  Laterality: Bilateral;     Family History   Problem Relation Age of Onset    Thyroid disease Mother     Heart disease Paternal Grandmother      Social History     Tobacco Use    Smoking status: Some Days     Current  packs/day: 0.00     Types: Vaping with nicotine    Smokeless tobacco: Never   Substance Use Topics    Alcohol use: Not Currently    Drug use: Never     Review of Systems   Gastrointestinal:  Positive for abdominal pain and blood in stool.   All other systems reviewed and are negative.      Physical Exam     Initial Vitals [07/30/23 2247]   BP Pulse Resp Temp SpO2   136/73 76 18 98 °F (36.7 °C) 99 %      MAP       --         Physical Exam    Nursing note and vitals reviewed.  Constitutional: She appears well-developed and well-nourished. She is not diaphoretic. No distress.   HENT:   Head: Normocephalic.   Eyes: Conjunctivae are normal.   Neck: Neck supple.   Normal range of motion.  Cardiovascular:  Normal rate.           Pulmonary/Chest: No respiratory distress.   Abdominal: She exhibits no distension. There is no abdominal tenderness.   Genitourinary:    Genitourinary Comments: Normal anus, no thrombosed or bleeding hemorrhoids     Musculoskeletal:         General: No edema.      Cervical back: Normal range of motion and neck supple.     Neurological: She is alert. She has normal strength. GCS eye subscore is 4. GCS verbal subscore is 5. GCS motor subscore is 6.   Skin: Skin is warm and dry.   Psychiatric: She has a normal mood and affect.         ED Course   Procedures  Labs Reviewed   CBC W/ AUTO DIFFERENTIAL - Abnormal; Notable for the following components:       Result Value    Hematocrit 36.5 (*)     All other components within normal limits   COMPREHENSIVE METABOLIC PANEL - Abnormal; Notable for the following components:    ALT 46 (*)     Anion Gap 7 (*)     All other components within normal limits          Imaging Results    None          Medications - No data to display  Medical Decision Making:   Initial Assessment:   18-year-old with 9 days of worsened bloody diarrhea.  Her abdominal exam is very benign.  She is hemodynamically stable.  Hemoglobin is normal at 12.3.  Patient was reassured.  Appropriate  for discharge, outpatient follow-up as scheduled.                          Clinical Impression:   Final diagnoses:  [K62.5] Rectal bleeding (Primary)        ED Disposition Condition    Discharge Stable          ED Prescriptions    None       Follow-up Information       Follow up With Specialties Details Why Contact Info    Gastroenterology                 Yovani White MD  07/31/23 1328

## 2023-08-02 ENCOUNTER — TELEPHONE (OUTPATIENT)
Dept: SURGERY | Facility: CLINIC | Age: 18
End: 2023-08-02
Payer: COMMERCIAL

## 2023-08-02 NOTE — TELEPHONE ENCOUNTER
----- Message from JARRETT Rob sent at 8/2/2023  4:34 PM CDT -----  Regarding: FW: Appt Confirmation for 08/03/2023/Urgent/Text msg sent for earlier appt/wait list  Contact: [phone]  Please handle this     ----- Message -----  From: Crista Sims  Sent: 8/2/2023   1:12 PM CDT  To: Vianey Belcher Staff  Subject: Appt Confirmation for 08/03/2023/Urgent/Text#    CONSULT/ADVISORY    Name of Caller: Tatiana Cross    Contact Preference: [phone]    Nature of Call: Pt had appt sched for 08/11/23, but was sent a text message for an earlier appt for tomorrow 08/03/2023 @ 230pm.  It's not showing up on her portal or Epic.  Pt is requesting a call back to confirm because she is traveling from [place].  Please call.

## 2023-08-03 ENCOUNTER — OFFICE VISIT (OUTPATIENT)
Dept: SURGERY | Facility: CLINIC | Age: 18
End: 2023-08-03
Payer: COMMERCIAL

## 2023-08-03 VITALS
DIASTOLIC BLOOD PRESSURE: 71 MMHG | HEART RATE: 77 BPM | BODY MASS INDEX: 38.23 KG/M2 | SYSTOLIC BLOOD PRESSURE: 104 MMHG | WEIGHT: 244.13 LBS

## 2023-08-03 DIAGNOSIS — K64.8 INTERNAL HEMORRHOIDS: Primary | ICD-10-CM

## 2023-08-03 DIAGNOSIS — R10.9 ABDOMINAL PAIN, UNSPECIFIED ABDOMINAL LOCATION: ICD-10-CM

## 2023-08-03 PROCEDURE — 99204 OFFICE O/P NEW MOD 45 MIN: CPT | Mod: 25,S$GLB,, | Performed by: NURSE PRACTITIONER

## 2023-08-03 PROCEDURE — 99999 PR PBB SHADOW E&M-EST. PATIENT-LVL III: ICD-10-PCS | Mod: PBBFAC,,, | Performed by: NURSE PRACTITIONER

## 2023-08-03 PROCEDURE — 3078F PR MOST RECENT DIASTOLIC BLOOD PRESSURE < 80 MM HG: ICD-10-PCS | Mod: CPTII,S$GLB,, | Performed by: NURSE PRACTITIONER

## 2023-08-03 PROCEDURE — 3008F PR BODY MASS INDEX (BMI) DOCUMENTED: ICD-10-PCS | Mod: CPTII,S$GLB,, | Performed by: NURSE PRACTITIONER

## 2023-08-03 PROCEDURE — 1160F PR REVIEW ALL MEDS BY PRESCRIBER/CLIN PHARMACIST DOCUMENTED: ICD-10-PCS | Mod: CPTII,S$GLB,, | Performed by: NURSE PRACTITIONER

## 2023-08-03 PROCEDURE — 3074F SYST BP LT 130 MM HG: CPT | Mod: CPTII,S$GLB,, | Performed by: NURSE PRACTITIONER

## 2023-08-03 PROCEDURE — 46600 PR DIAG2STIC A2SCOPY: ICD-10-PCS | Mod: S$GLB,,, | Performed by: NURSE PRACTITIONER

## 2023-08-03 PROCEDURE — 3074F PR MOST RECENT SYSTOLIC BLOOD PRESSURE < 130 MM HG: ICD-10-PCS | Mod: CPTII,S$GLB,, | Performed by: NURSE PRACTITIONER

## 2023-08-03 PROCEDURE — 3078F DIAST BP <80 MM HG: CPT | Mod: CPTII,S$GLB,, | Performed by: NURSE PRACTITIONER

## 2023-08-03 PROCEDURE — 1160F RVW MEDS BY RX/DR IN RCRD: CPT | Mod: CPTII,S$GLB,, | Performed by: NURSE PRACTITIONER

## 2023-08-03 PROCEDURE — 3008F BODY MASS INDEX DOCD: CPT | Mod: CPTII,S$GLB,, | Performed by: NURSE PRACTITIONER

## 2023-08-03 PROCEDURE — 99204 PR OFFICE/OUTPT VISIT, NEW, LEVL IV, 45-59 MIN: ICD-10-PCS | Mod: 25,S$GLB,, | Performed by: NURSE PRACTITIONER

## 2023-08-03 PROCEDURE — 1159F MED LIST DOCD IN RCRD: CPT | Mod: CPTII,S$GLB,, | Performed by: NURSE PRACTITIONER

## 2023-08-03 PROCEDURE — 99999 PR PBB SHADOW E&M-EST. PATIENT-LVL III: CPT | Mod: PBBFAC,,, | Performed by: NURSE PRACTITIONER

## 2023-08-03 PROCEDURE — 46600 DIAGNOSTIC ANOSCOPY SPX: CPT | Mod: S$GLB,,, | Performed by: NURSE PRACTITIONER

## 2023-08-03 PROCEDURE — 1159F PR MEDICATION LIST DOCUMENTED IN MEDICAL RECORD: ICD-10-PCS | Mod: CPTII,S$GLB,, | Performed by: NURSE PRACTITIONER

## 2023-08-03 RX ORDER — DICYCLOMINE HYDROCHLORIDE 10 MG/1
10 CAPSULE ORAL
Qty: 120 CAPSULE | Refills: 0 | Status: SHIPPED | OUTPATIENT
Start: 2023-08-03 | End: 2023-09-02

## 2023-08-03 RX ORDER — HYDROCORTISONE 25 MG/G
CREAM TOPICAL 2 TIMES DAILY
Qty: 28 G | Refills: 2 | Status: SHIPPED | OUTPATIENT
Start: 2023-08-03

## 2023-08-03 NOTE — PATIENT INSTRUCTIONS
Anusol cream internally/externally 2x/day for 2 weeks.  Daily fiber supplement like citrucel or fibercon.  Water intake > 64 oz/day!   No sitting/straining > 5 mins with bowel movements.   Bentyl up to 4x/day as needed for abdominal pain.  If no improvement in bleeding, please let me know for a repeat colonoscopy

## 2023-08-03 NOTE — PROGRESS NOTES
CRS Office Visit History and Physical    Referring Md:   Self, Aaareferral  No address on file    SUBJECTIVE:     Chief Complaint: abdominal pain, rectal bleeding    History of Present Illness:  The patient is new patient to this practice.   Course is as follows:  Patient is a 18 y.o. female presents with abdominal pain and rectal bleeding.  Symptoms have been present for years intermittently; most recent occurrence with 12 days bleeding. None noted in past 2 days.   Has tried nothing.  No relation between abdominal pain, bleeding, or bowel movements.   Reports generalized mid to R abdominal pain. Sharp in nature. Not relieved by bm.   Denies pain with bm  Associated bleeding: yes  Previous anorectal procedures: Yes  Uncomplicated vaginal delivery 3 months ago  confirms straining/prolonged time on toilet with bowel movements.  is not currently taking fiber supplement or stool softener. Bm 2-3x/day loose stools.   Blood thinners: No    Last Colonoscopy: 2-3 yrs ago, normal per pt report  Family history of colorectal cancer or IBD: none.    Review of patient's allergies indicates:  No Known Allergies    Past Medical History:   Diagnosis Date    Anxiety and depression     Asthma     Attention-deficit hyperactivity disorder, unspecified type     Chronic tonsillitis and adenoiditis     Herpes simplex virus (HSV) infection     Hypertrophy of nasal turbinates     Obesity, unspecified     Unspecified hemorrhoids      Past Surgical History:   Procedure Laterality Date    COLONOSCOPY N/A 05/27/2020    Procedure: COLONOSCOPY;  Surgeon: Bunny Eaton MD;  Location: [facility] ENDO;  Service: Endoscopy;  Laterality: N/A;    COLONOSCOPY      EXCISION, NASAL TURBINATE, SUBMUCOSAL Bilateral 6/2/2023    Procedure: EXCISION, NASAL TURBINATE, SUBMUCOSAL;  Surgeon: Víctor Costa MD;  Location: [facility] OR;  Service: ENT;  Laterality: Bilateral;    TONSILLECTOMY, ADENOIDECTOMY Bilateral 6/2/2023    Procedure: TONSILLECTOMY AND  ADENOIDECTOMY;  Surgeon: Víctor Costa MD;  Location: [facility] OR;  Service: ENT;  Laterality: Bilateral;     Family History   Problem Relation Age of Onset    Thyroid disease Mother     Heart disease Paternal Grandmother      Social History     Tobacco Use    Smoking status: Some Days     Current packs/day: 0.00     Types: Vaping with nicotine    Smokeless tobacco: Never   Substance Use Topics    Alcohol use: Not Currently    Drug use: Never        Review of Systems:  Review of Systems   Gastrointestinal:  Positive for abdominal pain, blood in stool, diarrhea and nausea. Negative for constipation, melena and vomiting.       OBJECTIVE:     Vital Signs (Most Recent)  Blood Pressure 104/71   Pulse 77   Weight 110.7 kg (244 lb 1.6 oz)   Last Menstrual Period 07/25/2023   Body Mass Index 38.23 kg/m²     Physical Exam:  General: White female in no distress   Neuro: Alert and oriented to person, place, and time.  Moves all extremities.     HEENT: No icterus.  Trachea midline  Respiratory: Respirations are even and unlabored, no cough or audible wheezing  Skin: Warm dry and intact, No visible rashes, no jaundice    Labs reviewed today:  Lab Results   Component Value Date    WBC 6.86 07/30/2023    HGB 12.3 07/30/2023    HCT 36.5 (L) 07/30/2023     07/30/2023    ALT 46 (H) 07/30/2023    AST 33 07/30/2023     07/30/2023    K 3.7 07/30/2023     07/30/2023    CREATININE 0.6 07/30/2023    BUN 14 07/30/2023    CO2 25 07/30/2023       Imaging reviewed today:  none    Endoscopy reviewed today:  Last Colonoscopy completed on 5/27/2020  - internal hemorrhoids    Anorectal Exam:    Anal Skin:  small external hemorrhoidal tag x2    Digital Rectal Exam:  Resting Tone normal  Mass none  Tenderness  absent    Anoscopy:  Verbal consent was obtained.   Clear plastic anoscope inserted.    Hemorrhoids  present  Stigmata of bleeding  present  Stigmata of prolapsed  absent  Distal rectal mucosa normal    ASSESSMENT/PLAN:      Diagnoses and all orders for this visit:    Internal hemorrhoids  -     hydrocortisone (ANUSOL-HC) 2.5 % rectal cream; Place rectally 2 (two) times daily.    Abdominal pain, unspecified abdominal location  -     dicyclomine (BENTYL) 10 MG capsule; Take 1 capsule (10 mg total) by mouth 4 (four) times daily before meals and nightly.        The patient was instructed to:  Anusol bid for 2 weeks  Bentyl prn  Increase water intake to at least 8-10 glasses of water per day.  Take a daily fiber supplement (Konsyl, Benefiber, Metamucil) and increase dietary intake to 20-30 grams/day.  Avoid straining or spending >5min/event with bowel movements.  Follow-up in clinic prn. Will message +- symptoms. Colonoscopy vs CT if s/s continue      ANGELINE Valerio  Colon and Rectal Surgery

## 2024-02-09 ENCOUNTER — OFFICE VISIT (OUTPATIENT)
Dept: URGENT CARE | Facility: CLINIC | Age: 19
End: 2024-02-09
Payer: COMMERCIAL

## 2024-02-09 VITALS
OXYGEN SATURATION: 98 % | WEIGHT: 255 LBS | BODY MASS INDEX: 40.02 KG/M2 | TEMPERATURE: 98 F | RESPIRATION RATE: 19 BRPM | HEIGHT: 67 IN | SYSTOLIC BLOOD PRESSURE: 113 MMHG | HEART RATE: 83 BPM | DIASTOLIC BLOOD PRESSURE: 70 MMHG

## 2024-02-09 DIAGNOSIS — H66.001 NON-RECURRENT ACUTE SUPPURATIVE OTITIS MEDIA OF RIGHT EAR WITHOUT SPONTANEOUS RUPTURE OF TYMPANIC MEMBRANE: Primary | ICD-10-CM

## 2024-02-09 DIAGNOSIS — J31.0 RHINITIS, UNSPECIFIED TYPE: ICD-10-CM

## 2024-02-09 PROCEDURE — 99213 OFFICE O/P EST LOW 20 MIN: CPT | Mod: 25,S$GLB,, | Performed by: NURSE PRACTITIONER

## 2024-02-09 PROCEDURE — 96372 THER/PROPH/DIAG INJ SC/IM: CPT | Mod: S$GLB,,, | Performed by: NURSE PRACTITIONER

## 2024-02-09 RX ORDER — DEXAMETHASONE SODIUM PHOSPHATE 100 MG/10ML
10 INJECTION INTRAMUSCULAR; INTRAVENOUS
Status: COMPLETED | OUTPATIENT
Start: 2024-02-09 | End: 2024-02-09

## 2024-02-09 RX ORDER — AMOXICILLIN AND CLAVULANATE POTASSIUM 875; 125 MG/1; MG/1
1 TABLET, FILM COATED ORAL EVERY 12 HOURS
Qty: 14 TABLET | Refills: 0 | Status: SHIPPED | OUTPATIENT
Start: 2024-02-09 | End: 2024-02-16

## 2024-02-09 RX ORDER — BROMPHENIRAMINE MALEATE, PSEUDOEPHEDRINE HYDROCHLORIDE, AND DEXTROMETHORPHAN HYDROBROMIDE 2; 30; 10 MG/5ML; MG/5ML; MG/5ML
10 SYRUP ORAL EVERY 4 HOURS PRN
Qty: 118 ML | Refills: 0 | Status: SHIPPED | OUTPATIENT
Start: 2024-02-09 | End: 2024-02-19

## 2024-02-09 RX ADMIN — DEXAMETHASONE SODIUM PHOSPHATE 10 MG: 100 INJECTION INTRAMUSCULAR; INTRAVENOUS at 04:02

## 2024-02-09 NOTE — PROGRESS NOTES
"Subjective:      Patient ID: Tatiana Cross is a 18 y.o. female.    Vitals:  height is 5' 7" (1.702 m) and weight is 115.7 kg (255 lb). Her oral temperature is 98 °F (36.7 °C). Her blood pressure is 113/70 and her pulse is 83. Her respiration is 19 and oxygen saturation is 98%.     Chief Complaint: Otalgia    Otalgia   There is pain in the left ear. This is a new problem. Episode onset: Six days. The problem occurs constantly. The problem has been gradually worsening. There has been no fever. The pain is at a severity of 1/10. The pain is mild. Associated symptoms include rhinorrhea. Pertinent negatives include no coughing, ear discharge, headaches, rash, sore throat or vomiting. Associated symptoms comments: Jaw pain . She has tried nothing for the symptoms.       Constitution: Negative for chills and fever.   HENT:  Positive for ear pain. Negative for ear discharge, congestion and sore throat.    Neck: Negative for painful lymph nodes.   Cardiovascular:  Negative for chest pain, palpitations and sob on exertion.   Respiratory:  Negative for cough and shortness of breath.    Gastrointestinal:  Negative for nausea and vomiting.   Musculoskeletal:  Negative for muscle ache.   Skin:  Negative for rash.   Neurological:  Negative for dizziness, light-headedness, passing out, headaches, disorientation and altered mental status.   Hematologic/Lymphatic: Negative for swollen lymph nodes.   Psychiatric/Behavioral:  Negative for altered mental status, disorientation and confusion.       Objective:     Physical Exam   Constitutional: She is oriented to person, place, and time. She appears well-developed. She is cooperative.  Non-toxic appearance. She does not appear ill. No distress.   HENT:   Head: Normocephalic and atraumatic.   Ears:   Right Ear: External ear and ear canal normal. Tympanic membrane is injected and retracted. Decreased hearing is noted.   Left Ear: Hearing, tympanic membrane, external ear and ear canal " normal.   Nose: Rhinorrhea present. No mucosal edema, nasal deformity or congestion. No epistaxis. Right sinus exhibits no maxillary sinus tenderness and no frontal sinus tenderness. Left sinus exhibits no maxillary sinus tenderness and no frontal sinus tenderness.   Mouth/Throat: Uvula is midline, oropharynx is clear and moist and mucous membranes are normal. Mucous membranes are moist. No trismus in the jaw. Normal dentition. No uvula swelling. No oropharyngeal exudate, posterior oropharyngeal edema or posterior oropharyngeal erythema. Tonsils are 0 on the right. Tonsils are 0 on the left.   Eyes: Conjunctivae and lids are normal. No scleral icterus.   Neck: Trachea normal and phonation normal. Neck supple. No edema present. No erythema present. No neck rigidity present.   Cardiovascular: Normal rate, regular rhythm, normal heart sounds and normal pulses.   Pulmonary/Chest: Effort normal and breath sounds normal. No stridor. No respiratory distress. She has no decreased breath sounds. She has no rhonchi.   Abdominal: Normal appearance.   Musculoskeletal: Normal range of motion.         General: No deformity. Normal range of motion.   Lymphadenopathy:     She has no cervical adenopathy.   Neurological: no focal deficit. She is alert and oriented to person, place, and time. She exhibits normal muscle tone. Coordination normal.   Skin: Skin is warm, dry, intact, not diaphoretic and not pale. Capillary refill takes 2 to 3 seconds.   Psychiatric: Her speech is normal and behavior is normal. Judgment and thought content normal.   Nursing note and vitals reviewed.      Assessment:     1. Non-recurrent acute suppurative otitis media of right ear without spontaneous rupture of tympanic membrane    2. Rhinitis, unspecified type        Plan:       Non-recurrent acute suppurative otitis media of right ear without spontaneous rupture of tympanic membrane  -     dexAMETHasone injection 10 mg  -     amoxicillin-clavulanate  875-125mg (AUGMENTIN) 875-125 mg per tablet; Take 1 tablet by mouth every 12 (twelve) hours. for 7 days  Dispense: 14 tablet; Refill: 0  -     brompheniramine-pseudoeph-DM (BROMFED DM) 2-30-10 mg/5 mL Syrp; Take 10 mLs by mouth every 4 (four) hours as needed (sinus congestion/drainage).  Dispense: 118 mL; Refill: 0    Rhinitis, unspecified type  -     brompheniramine-pseudoeph-DM (BROMFED DM) 2-30-10 mg/5 mL Syrp; Take 10 mLs by mouth every 4 (four) hours as needed (sinus congestion/drainage).  Dispense: 118 mL; Refill: 0      The   physical exam findings were discussed with the patient and all questions answered. We discussed symptom monitoring, conservative care methods, medication use, and follow up orders. She verbalized understanding and agreement with the plan of care.

## 2024-02-09 NOTE — PATIENT INSTRUCTIONS
Start Augmentin and Bromfed and take it as prescribed.   tylenol/Motrin as needed for fever/pain May alternate every 4 hours as needed  Avoid drinking straws and sucking motion until ear pain relieved  Follow up with PCP  Return to clinic for any new concern.

## 2025-01-09 ENCOUNTER — HOSPITAL ENCOUNTER (EMERGENCY)
Facility: HOSPITAL | Age: 20
Discharge: HOME | End: 2025-01-09
Attending: EMERGENCY MEDICINE
Payer: COMMERCIAL

## 2025-01-09 VITALS
OXYGEN SATURATION: 99 % | HEART RATE: 98 BPM | WEIGHT: 250 LBS | HEIGHT: 67 IN | BODY MASS INDEX: 39.24 KG/M2 | SYSTOLIC BLOOD PRESSURE: 128 MMHG | RESPIRATION RATE: 16 BRPM | DIASTOLIC BLOOD PRESSURE: 78 MMHG

## 2025-01-09 DIAGNOSIS — R07.9 CHEST PAIN: ICD-10-CM

## 2025-01-09 LAB
ALBUMIN SERPL BCP-MCNC: 4.4 G/DL (ref 3.5–5.2)
ALP SERPL-CCNC: 64 U/L (ref 40–150)
ALT SERPL W/O P-5'-P-CCNC: 41 U/L (ref 10–44)
ANION GAP SERPL CALC-SCNC: 12 MMOL/L (ref 8–16)
AST SERPL-CCNC: 28 U/L (ref 10–40)
BASOPHILS # BLD AUTO: 0.02 K/UL (ref 0–0.2)
BASOPHILS NFR BLD: 0.3 % (ref 0–1.9)
BILIRUB SERPL-MCNC: 0.6 MG/DL (ref 0.1–1)
BNP SERPL-MCNC: <10 PG/ML (ref 0–99)
BUN SERPL-MCNC: 12 MG/DL (ref 6–20)
CALCIUM SERPL-MCNC: 9.7 MG/DL (ref 8.7–10.5)
CHLORIDE SERPL-SCNC: 106 MMOL/L (ref 95–110)
CO2 SERPL-SCNC: 19 MMOL/L (ref 23–29)
CREAT SERPL-MCNC: 0.7 MG/DL (ref 0.5–1.4)
DIFFERENTIAL METHOD BLD: ABNORMAL
EOSINOPHIL # BLD AUTO: 0.1 K/UL (ref 0–0.5)
EOSINOPHIL NFR BLD: 1.8 % (ref 0–8)
ERYTHROCYTE [DISTWIDTH] IN BLOOD BY AUTOMATED COUNT: 11.6 % (ref 11.5–14.5)
EST. GFR  (NO RACE VARIABLE): >60 ML/MIN/1.73 M^2
GLUCOSE SERPL-MCNC: 84 MG/DL (ref 70–110)
HCT VFR BLD AUTO: 40.5 % (ref 37–48.5)
HGB BLD-MCNC: 14.4 G/DL (ref 12–16)
IMM GRANULOCYTES # BLD AUTO: 0.02 K/UL (ref 0–0.04)
IMM GRANULOCYTES NFR BLD AUTO: 0.3 % (ref 0–0.5)
LYMPHOCYTES # BLD AUTO: 2.4 K/UL (ref 1–4.8)
LYMPHOCYTES NFR BLD: 30.3 % (ref 18–48)
MCH RBC QN AUTO: 31.1 PG (ref 27–31)
MCHC RBC AUTO-ENTMCNC: 35.6 G/DL (ref 32–36)
MCV RBC AUTO: 88 FL (ref 82–98)
MONOCYTES # BLD AUTO: 0.4 K/UL (ref 0.3–1)
MONOCYTES NFR BLD: 4.8 % (ref 4–15)
NEUTROPHILS # BLD AUTO: 4.9 K/UL (ref 1.8–7.7)
NEUTROPHILS NFR BLD: 62.5 % (ref 38–73)
NRBC BLD-RTO: 0 /100 WBC
PLATELET # BLD AUTO: 286 K/UL (ref 150–450)
PMV BLD AUTO: 10 FL (ref 9.2–12.9)
POTASSIUM SERPL-SCNC: 3.8 MMOL/L (ref 3.5–5.1)
PROT SERPL-MCNC: 7.5 G/DL (ref 6–8.4)
RBC # BLD AUTO: 4.63 M/UL (ref 4–5.4)
SODIUM SERPL-SCNC: 137 MMOL/L (ref 136–145)
TROPONIN I SERPL DL<=0.01 NG/ML-MCNC: <0.006 NG/ML (ref 0–0.03)
TSH SERPL DL<=0.005 MIU/L-ACNC: 0.61 UIU/ML (ref 0.4–4)
WBC # BLD AUTO: 7.86 K/UL (ref 3.9–12.7)

## 2025-01-09 PROCEDURE — 36415 COLL VENOUS BLD VENIPUNCTURE: CPT | Performed by: NURSE PRACTITIONER

## 2025-01-09 PROCEDURE — 84484 ASSAY OF TROPONIN QUANT: CPT | Performed by: NURSE PRACTITIONER

## 2025-01-09 PROCEDURE — 71045 X-RAY EXAM CHEST 1 VIEW: CPT | Mod: 26,,, | Performed by: RADIOLOGY

## 2025-01-09 PROCEDURE — 83880 ASSAY OF NATRIURETIC PEPTIDE: CPT | Performed by: NURSE PRACTITIONER

## 2025-01-09 PROCEDURE — 93010 ELECTROCARDIOGRAM REPORT: CPT | Mod: ,,, | Performed by: INTERNAL MEDICINE

## 2025-01-09 PROCEDURE — 80053 COMPREHEN METABOLIC PANEL: CPT | Performed by: NURSE PRACTITIONER

## 2025-01-09 PROCEDURE — 84443 ASSAY THYROID STIM HORMONE: CPT | Performed by: NURSE PRACTITIONER

## 2025-01-09 PROCEDURE — 99285 EMERGENCY DEPT VISIT HI MDM: CPT | Mod: 25

## 2025-01-09 PROCEDURE — 85025 COMPLETE CBC W/AUTO DIFF WBC: CPT | Performed by: NURSE PRACTITIONER

## 2025-01-09 PROCEDURE — 71045 X-RAY EXAM CHEST 1 VIEW: CPT | Mod: TC

## 2025-01-09 PROCEDURE — 93005 ELECTROCARDIOGRAM TRACING: CPT

## 2025-01-09 RX ORDER — DEXTROAMPHETAMINE SACCHARATE, AMPHETAMINE ASPARTATE MONOHYDRATE, DEXTROAMPHETAMINE SULFATE AND AMPHETAMINE SULFATE 3.75; 3.75; 3.75; 3.75 MG/1; MG/1; MG/1; MG/1
15 CAPSULE, EXTENDED RELEASE ORAL EVERY MORNING
COMMUNITY

## 2025-01-09 NOTE — ED PROVIDER NOTES
CHIEF COMPLAINT  Chief Complaint   Patient presents with    Chest Pain     19 y.o. female to ED with c.o. intermittent midsternal chest pain that sometimes radiates to left axilla. Patient endorses associated shortness of breath and diaphoresis. Patient denies cough, denies fever/ chills, denies n/v/d, denies all other medical complaints at this time.       HISTORY OF PRESENT ILLNESS  Tatiana Cross is a 19 y.o. female with PMH as below who presents to ER for evaluation of intermittent pain on midsternum. She states her thoughts sometimes all over, pain normal starts with random thoughts, anxiety presentation. She denies SI/HI, family or other stress right now. She bought her son in ER. She went to her PCP for her ADHD medication refill, she was advised to come to ER for evaluation on chest pain. On the way over ER, her symptoms resolved. During physical exam, she states pain stropped. Pain started when she was at her PCP's office, while coming over to ER, her symptoms resolved.  No other specific aggravating or relieving factors otherwise.      PAST MEDICAL HISTORY  Past Medical History:   Diagnosis Date    Anxiety and depression     Asthma     Attention-deficit hyperactivity disorder, unspecified type     Chronic tonsillitis and adenoiditis     Herpes simplex virus (HSV) infection     Hypertrophy of nasal turbinates     Obesity, unspecified     Unspecified hemorrhoids        CURRENT MEDICATIONS      Current Facility-Administered Medications:     etonogestreL subdermal device 68 mg, 68 mg, Implant, , Ximena Staples, LUCA, 68 mg at 12/05/23 1345    Current Outpatient Medications:     dextroamphetamine-amphetamine (ADDERALL XR) 15 MG 24 hr capsule, Take 15 mg by mouth every morning., Disp: , Rfl:     albuterol (PROVENTIL/VENTOLIN HFA) 90 mcg/actuation inhaler, Inhale 2 puffs into the lungs every 4 (four) hours as needed., Disp: , Rfl:     FLUoxetine 20 MG capsule, Take 20 mg by mouth once daily., Disp: , Rfl:      hydrocortisone (ANUSOL-HC) 2.5 % rectal cream, Place rectally 2 (two) times daily., Disp: 28 g, Rfl: 2    metroNIDAZOLE (FLAGYL) 500 MG tablet, , Disp: , Rfl:     PNV cmb#95-ferrous fumarate-FA (PRENATAL) 28 mg iron- 800 mcg Tab, Take 1 tablet by mouth once daily., Disp: , Rfl:     valACYclovir (VALTREX) 500 MG tablet, Take 500 mg by mouth., Disp: , Rfl:     VYVANSE 50 mg capsule, Take 50 mg by mouth every morning., Disp: , Rfl:     ALLERGIES    Review of patient's allergies indicates:  No Known Allergies    SURGICAL HISTORY    Past Surgical History:   Procedure Laterality Date    COLONOSCOPY N/A 05/27/2020    Procedure: COLONOSCOPY;  Surgeon: Bunny Eaton MD;  Location: [facility] ENDO;  Service: Endoscopy;  Laterality: N/A;    COLONOSCOPY      EXCISION, NASAL TURBINATE, SUBMUCOSAL Bilateral 6/2/2023    Procedure: EXCISION, NASAL TURBINATE, SUBMUCOSAL;  Surgeon: Víctor Costa MD;  Location: [facility] OR;  Service: ENT;  Laterality: Bilateral;    TONSILLECTOMY, ADENOIDECTOMY Bilateral 6/2/2023    Procedure: TONSILLECTOMY AND ADENOIDECTOMY;  Surgeon: Víctor Costa MD;  Location: [facility] OR;  Service: ENT;  Laterality: Bilateral;       SOCIAL HISTORY    Social History     Socioeconomic History    Marital status: Single   Tobacco Use    Smoking status: Some Days     Types: Vaping with nicotine    Smokeless tobacco: Never   Substance and Sexual Activity    Alcohol use: Yes     Comment: occ    Drug use: Never    Sexual activity: Yes     Partners: Male     Birth control/protection: None, Condom     Social Drivers of Health     Financial Resource Strain: Medium Risk (6/6/2023)    Overall Financial Resource Strain (CARDIA)     Difficulty of Paying Living Expenses: Somewhat hard   Food Insecurity: No Food Insecurity (6/6/2023)    Hunger Vital Sign     Worried About Running Out of Food in the Last Year: Never true     Ran Out of Food in the Last Year: Never true   Transportation Needs: No Transportation Needs (6/6/2023)     "PRAPARE - Transportation     Lack of Transportation (Medical): No     Lack of Transportation (Non-Medical): No   Physical Activity: Inactive (6/6/2023)    Exercise Vital Sign     Days of Exercise per Week: 0 days     Minutes of Exercise per Session: 0 min   Stress: No Stress Concern Present (6/6/2023)    [employer] [place] [employer] - Occupational Stress Questionnaire     Feeling of Stress : Not at all   Housing Stability: Unknown (6/6/2023)    Housing Stability Vital Sign     Unable to Pay for Housing in the Last Year: No     Unstable Housing in the Last Year: No       FAMILY HISTORY    Family History   Problem Relation Name Age of Onset    Diabetes Paternal Grandfather      Heart disease Paternal Grandmother      Hypertension Father      Thyroid disease Mother         REVIEW OF SYSTEMS    Review of Systems   Cardiovascular:  Positive for chest pain.   Psychiatric/Behavioral:  The patient is nervous/anxious.      All other systems reviewed and are negative    VITAL SIGNS:   /78   Pulse 98   Resp 16   Ht 5' 7" (1.702 m)   Wt 113.4 kg (250 lb)   LMP  (LMP Unknown) Comment: patient states it has been months - she has not had it since the nexplanon was implanted  SpO2 99%   Breastfeeding No   BMI 39.16 kg/m²      Physical Exam  Constitutional:       Appearance: Normal appearance.   HENT:      Head: Normocephalic.   Cardiovascular:      Rate and Rhythm: Normal rate.   Pulmonary:      Effort: Pulmonary effort is normal. No respiratory distress.      Breath sounds: Normal breath sounds.   Abdominal:      Palpations: Abdomen is soft.   Musculoskeletal:         General: Normal range of motion.   Skin:     General: Skin is warm.      Capillary Refill: Capillary refill takes less than 2 seconds.   Neurological:      General: No focal deficit present.      Mental Status: She is alert.      GCS: GCS eye subscore is 4. GCS verbal subscore is 5. GCS motor subscore is 6.   Psychiatric:         Attention " and Perception: Attention normal.         Mood and Affect: Mood normal.         Speech: Speech normal.       Vitals and nursing note reviewed.     LABS    Labs Reviewed   CBC W/ AUTO DIFFERENTIAL - Abnormal       Result Value    WBC 7.86      RBC 4.63      Hemoglobin 14.4      Hematocrit 40.5      MCV 88      MCH 31.1 (*)     MCHC 35.6      RDW 11.6      Platelets 286      MPV 10.0      Immature Granulocytes 0.3      Gran # (ANC) 4.9      Immature Grans (Abs) 0.02      Lymph # 2.4      Mono # 0.4      Eos # 0.1      Baso # 0.02      nRBC 0      Gran % 62.5      Lymph % 30.3      Mono % 4.8      Eosinophil % 1.8      Basophil % 0.3      Differential Method Automated     COMPREHENSIVE METABOLIC PANEL - Abnormal    Sodium 137      Potassium 3.8      Chloride 106      CO2 19 (*)     Glucose 84      BUN 12      Creatinine 0.7      Calcium 9.7      Total Protein 7.5      Albumin 4.4      Total Bilirubin 0.6      Alkaline Phosphatase 64      AST 28      ALT 41      eGFR >60.0      Anion Gap 12     B-TYPE NATRIURETIC PEPTIDE    BNP <10     TROPONIN I   TSH   TSH    TSH 0.615     TROPONIN I    Troponin I <0.006           EKG    No results found for this or any previous visit.      RADIOLOGY    X-Ray Chest AP Portable   Final Result      No acute abnormality.         Electronically signed by: Tala Jonas   Date:    01/09/2025   Time:    14:42            PROCEDURES    Procedures    Medications - No data to display      ED Course as of 01/09/25 1830   Thu Jan 09, 2025   1501 EKG: sinus tachycardia, , no st elevation   [GK]   1502 Patient denies chest pain [GK]   1519 Troponin I: <0.006 [GK]   1519 BNP: <10 [GK]   1519 The lungs are clear, with normal appearance of pulmonary vasculature and no pleural effusion or pneumothorax.     The cardiac silhouette is normal in size. The hilar and mediastinal contours are unremarkable.     Bones are intact.     Impression:     No acute abnormality.   [GK]      ED Course  User Index  [GK] Rocky Baker NP         Medical Decision Making  Tatiana Cross is a 19 y.o. female with PMH as below who presents to ER for evaluation of intermittent pain on midsternum. She states her thoughts sometimes all over, pain normal starts with random thoughts, anxiety presentation. She denies SI/HI, family or other stress right now. She bought her son in ER. She went to her PCP for her ADHD medication refill, she was advised to come to ER for evaluation on chest pain. On the way over ER, her symptoms resolved. During physical exam, she states pain stropped. Pain started when she was at her PCP's office, while coming over to ER, her symptoms resolved.  No other specific aggravating or relieving factors otherwise.    DDX: PE, pneumonia, tamponade, STEMI, dissection, esophageal rupture, CHF, arrhythmia   Patient felt resolved during ER visit and has a lower risk of impending cardiac failure to the heart score of 0. Patient was discharged with follow up with PCP, cardiologist. Stable ED course.  Unremarkable workup.  No ongoing symptoms.  No evidence of focal weakness, infection, respiratory compromise, hypoxia, arrhythmia, anemia, LITA or ACS.  After taking into careful account the historical factors and physical exam findings of the patient's presentation today, in conjunction with imaging studies, no acute emergent medical condition requiring admission has been identified.   Patient was discharged to home with supportive care       Problems Addressed:  Chest pain: acute illness or injury    Amount and/or Complexity of Data Reviewed  Labs: ordered. Decision-making details documented in ED Course.  Radiology: ordered. Decision-making details documented in ED Course.  ECG/medicine tests: ordered. Decision-making details documented in ED Course.           Discharge Medication List as of 1/9/2025  3:48 PM          Discharge Medication List as of 1/9/2025  3:48 PM          I discussed with patient and/or  family/caretaker that evaluation in the ED does not suggest any emergent or life threatening medical condition requiring immediate intervention beyond what was provided in the ED, and I believe the patient is safe for discharge.  Regardless, an unremarkable evaluation in the ED does not preclude the development or presence of a serious or life threatening condition. As such, patient was instructed to return immediately for any worsening or change in current symptoms  Patient agrees with plan of care.    DISPOSITION  Patient discharged to home in stable condition.        FINAL IMPRESSION    1. Chest pain         Rocky Baker NP  01/09/25 4357

## 2025-01-10 LAB
OHS QRS DURATION: 72 MS
OHS QTC CALCULATION: 483 MS

## 2025-01-16 ENCOUNTER — OFFICE VISIT (OUTPATIENT)
Dept: CARDIOLOGY | Facility: CLINIC | Age: 20
End: 2025-01-16
Payer: COMMERCIAL

## 2025-01-16 VITALS
DIASTOLIC BLOOD PRESSURE: 68 MMHG | SYSTOLIC BLOOD PRESSURE: 114 MMHG | OXYGEN SATURATION: 99 % | BODY MASS INDEX: 40.2 KG/M2 | WEIGHT: 256.69 LBS | HEART RATE: 78 BPM

## 2025-01-16 DIAGNOSIS — R07.2 PRECORDIAL PAIN: ICD-10-CM

## 2025-01-16 DIAGNOSIS — R94.31 ABNORMAL ECG: Primary | ICD-10-CM

## 2025-01-16 PROCEDURE — 3078F DIAST BP <80 MM HG: CPT | Mod: CPTII,S$GLB,, | Performed by: INTERNAL MEDICINE

## 2025-01-16 PROCEDURE — 99999 PR PBB SHADOW E&M-EST. PATIENT-LVL III: CPT | Mod: PBBFAC,,, | Performed by: INTERNAL MEDICINE

## 2025-01-16 PROCEDURE — 99204 OFFICE O/P NEW MOD 45 MIN: CPT | Mod: S$GLB,,, | Performed by: INTERNAL MEDICINE

## 2025-01-16 PROCEDURE — 1159F MED LIST DOCD IN RCRD: CPT | Mod: CPTII,S$GLB,, | Performed by: INTERNAL MEDICINE

## 2025-01-16 PROCEDURE — 3008F BODY MASS INDEX DOCD: CPT | Mod: CPTII,S$GLB,, | Performed by: INTERNAL MEDICINE

## 2025-01-16 PROCEDURE — 3074F SYST BP LT 130 MM HG: CPT | Mod: CPTII,S$GLB,, | Performed by: INTERNAL MEDICINE

## 2025-01-16 PROCEDURE — 1160F RVW MEDS BY RX/DR IN RCRD: CPT | Mod: CPTII,S$GLB,, | Performed by: INTERNAL MEDICINE

## 2025-01-16 RX ORDER — BUSPIRONE HYDROCHLORIDE 5 MG/1
5 TABLET ORAL 2 TIMES DAILY
COMMUNITY

## 2025-01-16 NOTE — PROGRESS NOTES
[facility]    Chief Complaint  Chief Complaint   Patient presents with    Chest Pain       HPI:    Presents for evaluation because of an abnormal EKG.  EKG was performed for chest pain.  She had been going to her primary care provider to get medication refills.  Began developing a pain along her sternocostal border like someone was stepping on it repeatedly.  Lasted a few minutes and spontaneously resolved.  Has had similar problems in the past.  Was sent to the emergency department.  Workup in the emergency department was unrevealing.  No recurrence of chest pain since.  No regular exercise.  But she reports being active without exertional dyspnea or chest discomfort.    Medications  Current Outpatient Medications   Medication Sig Dispense Refill    albuterol (PROVENTIL/VENTOLIN HFA) 90 mcg/actuation inhaler Inhale 2 puffs into the lungs every 4 (four) hours as needed.      busPIRone (BUSPAR) 5 MG Tab Take 5 mg by mouth 2 (two) times daily.      dextroamphetamine-amphetamine (ADDERALL XR) 15 MG 24 hr capsule Take 15 mg by mouth every morning.      valACYclovir (VALTREX) 500 MG tablet Take 500 mg by mouth.      FLUoxetine 20 MG capsule Take 20 mg by mouth once daily. (Patient not taking: Reported on 1/16/2025)      hydrocortisone (ANUSOL-HC) 2.5 % rectal cream Place rectally 2 (two) times daily. (Patient not taking: Reported on 1/16/2025) 28 g 2    metroNIDAZOLE (FLAGYL) 500 MG tablet  (Patient not taking: Reported on 1/16/2025)      PNV cmb#95-ferrous fumarate-FA (PRENATAL) 28 mg iron- 800 mcg Tab Take 1 tablet by mouth once daily. (Patient not taking: Reported on 1/16/2025)      VYVANSE 50 mg capsule Take 50 mg by mouth every morning. (Patient not taking: Reported on 1/16/2025)       Current Facility-Administered Medications   Medication Dose Route Frequency Provider Last Rate Last Admin    etonogestreL subdermal device 68 mg  68 mg Implant  Ximena Staples WHNP   68 mg at 12/05/23 7698         History  Past Medical History:   Diagnosis Date    Anxiety and depression     Asthma     Attention-deficit hyperactivity disorder, unspecified type     Chronic tonsillitis and adenoiditis     Herpes simplex virus (HSV) infection     Hypertrophy of nasal turbinates     Obesity, unspecified     Unspecified hemorrhoids      Past Surgical History:   Procedure Laterality Date    COLONOSCOPY N/A 05/27/2020    Procedure: COLONOSCOPY;  Surgeon: Bunny Eaton MD;  Location: [facility] ENDO;  Service: Endoscopy;  Laterality: N/A;    COLONOSCOPY      EXCISION, NASAL TURBINATE, SUBMUCOSAL Bilateral 6/2/2023    Procedure: EXCISION, NASAL TURBINATE, SUBMUCOSAL;  Surgeon: Víctor Costa MD;  Location: [facility] OR;  Service: ENT;  Laterality: Bilateral;    TONSILLECTOMY, ADENOIDECTOMY Bilateral 6/2/2023    Procedure: TONSILLECTOMY AND ADENOIDECTOMY;  Surgeon: Víctor Costa MD;  Location: [facility] OR;  Service: ENT;  Laterality: Bilateral;     Social History     Socioeconomic History    Marital status: Single   Tobacco Use    Smoking status: Some Days     Types: Vaping with nicotine    Smokeless tobacco: Never   Substance and Sexual Activity    Alcohol use: Yes     Comment: occ    Drug use: Never    Sexual activity: Yes     Partners: Male     Birth control/protection: None, Condom     Social Drivers of Health     Financial Resource Strain: Medium Risk (6/6/2023)    Overall Financial Resource Strain (CARDIA)     Difficulty of Paying Living Expenses: Somewhat hard   Food Insecurity: No Food Insecurity (6/6/2023)    Hunger Vital Sign     Worried About Running Out of Food in the Last Year: Never true     Ran Out of Food in the Last Year: Never true   Transportation Needs: No Transportation Needs (6/6/2023)    PRAPARE - Transportation     Lack of Transportation (Medical): No     Lack of Transportation (Non-Medical): No   Physical Activity: Inactive (6/6/2023)    Exercise Vital Sign     Days of Exercise per Week: 0 days     Minutes of  Exercise per Session: 0 min   Stress: No Stress Concern Present (6/6/2023)    [employer] [place] [employer] - Occupational Stress Questionnaire     Feeling of Stress : Not at all   Housing Stability: Unknown (6/6/2023)    Housing Stability Vital Sign     Unable to Pay for Housing in the Last Year: No     Unstable Housing in the Last Year: No     Family History   Problem Relation Name Age of Onset    Diabetes Paternal Grandfather      Heart disease Paternal Grandmother      Hypertension Father      Thyroid disease Mother          Allergies  Review of patient's allergies indicates:  No Known Allergies    Review of Systems   Review of Systems   Constitutional: Negative for malaise/fatigue, weight gain and weight loss.   Eyes:  Negative for visual disturbance.   Cardiovascular:  Positive for chest pain. Negative for claudication, cyanosis, dyspnea on exertion, irregular heartbeat, leg swelling, near-syncope, orthopnea, palpitations, paroxysmal nocturnal dyspnea and syncope.   Respiratory:  Negative for cough, hemoptysis, shortness of breath, sleep disturbances due to breathing and wheezing.    Hematologic/Lymphatic: Negative for bleeding problem. Does not bruise/bleed easily.   Skin:  Negative for poor wound healing.   Musculoskeletal:  Negative for muscle cramps and myalgias.   Gastrointestinal:  Negative for abdominal pain, anorexia, diarrhea, heartburn, hematemesis, hematochezia, melena, nausea and vomiting.   Genitourinary:  Negative for hematuria and nocturia.   Neurological:  Negative for excessive daytime sleepiness, dizziness, focal weakness, light-headedness and weakness.       Physical Exam  Vitals:    01/16/25 1445   BP: 114/68   Pulse: 78     Wt Readings from Last 1 Encounters:   01/16/25 116.4 kg (256 lb 11.2 oz)     Physical Exam  Vitals and nursing note reviewed.   Constitutional:       General: She is not in acute distress.     Appearance: She is obese. She is not toxic-appearing or  diaphoretic.   HENT:      Head: Normocephalic and atraumatic.      Mouth/Throat:      Lips: Pink.      Mouth: Mucous membranes are moist.   Eyes:      General: No scleral icterus.     Conjunctiva/sclera: Conjunctivae normal.   Neck:      Thyroid: No thyromegaly.      Vascular: No carotid bruit, hepatojugular reflux or JVD.      Trachea: Trachea normal.   Cardiovascular:      Rate and Rhythm: Normal rate and regular rhythm. No extrasystoles are present.     Chest Wall: PMI is not displaced.      Pulses:           Carotid pulses are 2+ on the right side and 2+ on the left side.       Radial pulses are 2+ on the right side and 2+ on the left side.        Dorsalis pedis pulses are 2+ on the right side and 2+ on the left side.        Posterior tibial pulses are 2+ on the right side and 2+ on the left side.      Heart sounds: S1 normal and S2 normal. No murmur heard.     No friction rub. No S3 or S4 sounds.   Pulmonary:      Effort: Pulmonary effort is normal. No accessory muscle usage or respiratory distress.      Breath sounds: Normal breath sounds and air entry. No decreased breath sounds, wheezing, rhonchi or rales.   Abdominal:      General: Bowel sounds are normal. There is no distension or abdominal bruit.      Palpations: Abdomen is soft. There is no hepatomegaly, splenomegaly or pulsatile mass.      Tenderness: There is no abdominal tenderness.   Musculoskeletal:         General: No tenderness or deformity.      Right lower leg: No edema.      Left lower leg: No edema.   Skin:     General: Skin is warm and dry.      Capillary Refill: Capillary refill takes less than 2 seconds.      Coloration: Skin is not cyanotic or pale.      Nails: There is no clubbing.   Neurological:      General: No focal deficit present.      Mental Status: She is alert and oriented to person, place, and time.   Psychiatric:         Attention and Perception: Attention normal.         Mood and Affect: Mood normal.         Speech: Speech  normal.         Behavior: Behavior normal. Behavior is cooperative.         Labs  Admission on 01/09/2025, Discharged on 01/09/2025   Component Date Value Ref Range Status    QRS Duration 01/09/2025 72  ms Final    [facility] QTC Calculation 01/09/2025 483  ms Final    WBC 01/09/2025 7.86  3.90 - 12.70 K/uL Final    RBC 01/09/2025 4.63  4.00 - 5.40 M/uL Final    Hemoglobin 01/09/2025 14.4  12.0 - 16.0 g/dL Final    Hematocrit 01/09/2025 40.5  37.0 - 48.5 % Final    MCV 01/09/2025 88  82 - 98 fL Final    MCH 01/09/2025 31.1 (H)  27.0 - 31.0 pg Final    MCHC 01/09/2025 35.6  32.0 - 36.0 g/dL Final    RDW 01/09/2025 11.6  11.5 - 14.5 % Final    Platelets 01/09/2025 286  150 - 450 K/uL Final    MPV 01/09/2025 10.0  9.2 - 12.9 fL Final    Immature Granulocytes 01/09/2025 0.3  0.0 - 0.5 % Final    Gran # (ANC) 01/09/2025 4.9  1.8 - 7.7 K/uL Final    Immature Grans (Abs) 01/09/2025 0.02  0.00 - 0.04 K/uL Final    Comment: Mild elevation in immature granulocytes is non specific and   can be seen in a variety of conditions including stress response,   acute inflammation, trauma and pregnancy. Correlation with other   laboratory and clinical findings is essential.      Lymph # 01/09/2025 2.4  1.0 - 4.8 K/uL Final    Mono # 01/09/2025 0.4  0.3 - 1.0 K/uL Final    Eos # 01/09/2025 0.1  0.0 - 0.5 K/uL Final    Baso # 01/09/2025 0.02  0.00 - 0.20 K/uL Final    nRBC 01/09/2025 0  0 /100 WBC Final    Gran % 01/09/2025 62.5  38.0 - 73.0 % Final    Lymph % 01/09/2025 30.3  18.0 - 48.0 % Final    Mono % 01/09/2025 4.8  4.0 - 15.0 % Final    Eosinophil % 01/09/2025 1.8  0.0 - 8.0 % Final    Basophil % 01/09/2025 0.3  0.0 - 1.9 % Final    Differential Method 01/09/2025 Automated   Final    Sodium 01/09/2025 137  136 - 145 mmol/L Final    Potassium 01/09/2025 3.8  3.5 - 5.1 mmol/L Final    Chloride 01/09/2025 106  95 - 110 mmol/L Final    CO2 01/09/2025 19 (L)  23 - 29 mmol/L Final    Glucose 01/09/2025 84  70 - 110 mg/dL Final    BUN  01/09/2025 12  6 - 20 mg/dL Final    Creatinine 01/09/2025 0.7  0.5 - 1.4 mg/dL Final    Calcium 01/09/2025 9.7  8.7 - 10.5 mg/dL Final    Total Protein 01/09/2025 7.5  6.0 - 8.4 g/dL Final    Albumin 01/09/2025 4.4  3.5 - 5.2 g/dL Final    Total Bilirubin 01/09/2025 0.6  0.1 - 1.0 mg/dL Final    Comment: For infants and newborns, interpretation of results should be based  on gestational age, weight and in agreement with clinical  observations.    Premature Infant recommended reference ranges:  Up to 24 hours.............<8.0 mg/dL  Up to 48 hours............<12.0 mg/dL  3-5 days..................<15.0 mg/dL  6-29 days.................<15.0 mg/dL      Alkaline Phosphatase 01/09/2025 64  40 - 150 U/L Final    AST 01/09/2025 28  10 - 40 U/L Final    ALT 01/09/2025 41  10 - 44 U/L Final    eGFR 01/09/2025 >60.0  >60 mL/min/1.73 m^2 Final    Anion Gap 01/09/2025 12  8 - 16 mmol/L Final    BNP 01/09/2025 <10  0 - 99 pg/mL Final    Values of less than 100 pg/ml are consistent with non-CHF populations.    TSH 01/09/2025 0.615  0.400 - 4.000 uIU/mL Final    Troponin I 01/09/2025 <0.006  0.000 - 0.026 ng/mL Final    Comment: The reference interval for Troponin I represents the 99th percentile   cutoff   for our facility and is consistent with 3rd generation assay   performance.         Imaging  X-Ray Chest AP Portable    Result Date: 1/9/2025  EXAMINATION: XR CHEST AP PORTABLE CLINICAL HISTORY: chest pain; TECHNIQUE: Single frontal view of the chest was performed. COMPARISON: None FINDINGS: The lungs are clear, with normal appearance of pulmonary vasculature and no pleural effusion or pneumothorax. The cardiac silhouette is normal in size. The hilar and mediastinal contours are unremarkable. Bones are intact.     No acute abnormality. Electronically signed by: Tala Jonas Date:    01/09/2025 Time:    14:42      Assessment  1. Abnormal ECG   Likely related to lead placement  - Echo; Future    2. Precordial  pain  Noncardiac by history.  Low risk  - Echo; Future      Plan and Discussion    Will plan for an echocardiogram to confirm that there are no wall motion abnormalities consistent with the infarct red on the prior ECGs.  Discussed importance of risk factor modification for prevention of future cardiac events with an emphasis on regular exercise and weight loss.    The ASCVD Risk score (Lanette RUELAS, et al., 2019) failed to calculate for the following reasons:    The 2019 ASCVD risk score is only valid for ages 40 to 79     Follow Up  Follow up for test results.      Parrish Vidales MD

## 2025-01-29 ENCOUNTER — HOSPITAL ENCOUNTER (OUTPATIENT)
Dept: CARDIOLOGY | Facility: HOSPITAL | Age: 20
Discharge: HOME | End: 2025-01-29
Attending: INTERNAL MEDICINE
Payer: COMMERCIAL

## 2025-01-29 VITALS — BODY MASS INDEX: 40.28 KG/M2 | WEIGHT: 256.63 LBS | HEIGHT: 67 IN

## 2025-01-29 DIAGNOSIS — R94.31 ABNORMAL ECG: ICD-10-CM

## 2025-01-29 DIAGNOSIS — R07.2 PRECORDIAL PAIN: ICD-10-CM

## 2025-01-29 LAB
AORTIC ROOT ANNULUS: 3.06 CM
AORTIC VALVE CUSP SEPERATION: 1.99 CM
APICAL FOUR CHAMBER EJECTION FRACTION: 70 %
AV INDEX (PROSTH): 0.77
AV MEAN GRADIENT: 7 MMHG
AV PEAK GRADIENT: 13 MMHG
AV VALVE AREA BY VELOCITY RATIO: 3 CM²
AV VALVE AREA: 3.2 CM²
AV VELOCITY RATIO: 0.72
BSA FOR ECHO PROCEDURE: 2.35 M2
CV ECHO LV RWT: 0.43 CM
DOP CALC AO PEAK VEL: 1.8 M/S
DOP CALC AO VTI: 34.3 CM
DOP CALC LVOT AREA: 4.2 CM2
DOP CALC LVOT DIAMETER: 2.3 CM
DOP CALC LVOT PEAK VEL: 1.3 M/S
DOP CALC LVOT STROKE VOLUME: 109.6 CM3
DOP CALC MV VTI: 20.6 CM
DOP CALCLVOT PEAK VEL VTI: 26.4 CM
E WAVE DECELERATION TIME: 134 MSEC
E/A RATIO: 1.19
E/E' RATIO: 10 M/S
ECHO LV POSTERIOR WALL: 1 CM (ref 0.6–1.1)
FRACTIONAL SHORTENING: 38.3 % (ref 28–44)
INTERVENTRICULAR SEPTUM: 0.9 CM (ref 0.6–1.1)
IVC DIAMETER: 1.68 CM
IVRT: 71 MSEC
LA MAJOR: 5.6 CM
LA MINOR: 5.8 CM
LEFT ATRIUM SIZE: 3.7 CM
LEFT INTERNAL DIMENSION IN SYSTOLE: 2.9 CM (ref 2.1–4)
LEFT VENTRICLE DIASTOLIC VOLUME INDEX: 45.73 ML/M2
LEFT VENTRICLE DIASTOLIC VOLUME: 102.9 ML
LEFT VENTRICLE END DIASTOLIC VOLUME APICAL 2 CHAMBER: 80.68 ML
LEFT VENTRICLE END DIASTOLIC VOLUME APICAL 4 CHAMBER: 112.38 ML
LEFT VENTRICLE MASS INDEX: 68.2 G/M2
LEFT VENTRICLE SYSTOLIC VOLUME INDEX: 14.5 ML/M2
LEFT VENTRICLE SYSTOLIC VOLUME: 32.56 ML
LEFT VENTRICULAR INTERNAL DIMENSION IN DIASTOLE: 4.7 CM (ref 3.5–6)
LEFT VENTRICULAR MASS: 153.4 G
LV LATERAL E/E' RATIO: 7 M/S
LV SEPTAL E/E' RATIO: 15 M/S
LVED V (TEICH): 102.9 ML
LVES V (TEICH): 32.56 ML
LVOT MG: 4.19 MMHG
LVOT MV: 0.97 CM/S
MV A" WAVE DURATION": 121.31 MSEC
MV MEAN GRADIENT: 2 MMHG
MV PEAK A VEL: 0.88 M/S
MV PEAK E VEL: 1.05 M/S
MV PEAK GRADIENT: 4 MMHG
MV STENOSIS PRESSURE HALF TIME: 52.57 MS
MV VALVE AREA BY CONTINUITY EQUATION: 5.32 CM2
MV VALVE AREA P 1/2 METHOD: 4.18 CM2
OHS CV RV/LV RATIO: 0.45 CM
PISA TR MAX VEL: 1.4 M/S
PV MV: 1.01 M/S
PV PEAK GRADIENT: 8 MMHG
PV PEAK VELOCITY: 1.38 M/S
RA MAJOR: 4.61 CM
RA PRESSURE ESTIMATED: 3 MMHG
RIGHT VENTRICLE DIASTOLIC BASEL DIMENSION: 2.1 CM
RIGHT VENTRICULAR END-DIASTOLIC DIMENSION: 2.14 CM
RV TB RVSP: 4 MMHG
RV TISSUE DOPPLER FREE WALL SYSTOLIC VELOCITY 1 (APICAL 4 CHAMBER VIEW): 21.51 CM/S
TDI LATERAL: 0.15 M/S
TDI SEPTAL: 0.07 M/S
TDI: 0.11 M/S
TR MAX PG: 8 MMHG
TRICUSPID ANNULAR PLANE SYSTOLIC EXCURSION: 2.05 CM
TV REST PULMONARY ARTERY PRESSURE: 11 MMHG
Z-SCORE OF LEFT VENTRICULAR DIMENSION IN END DIASTOLE: -5.5
Z-SCORE OF LEFT VENTRICULAR DIMENSION IN END SYSTOLE: -4.2

## 2025-01-29 PROCEDURE — 93306 TTE W/DOPPLER COMPLETE: CPT | Mod: 26,,, | Performed by: GENERAL PRACTICE

## 2025-01-29 PROCEDURE — 93306 TTE W/DOPPLER COMPLETE: CPT

## 2025-02-05 ENCOUNTER — HOSPITAL ENCOUNTER (EMERGENCY)
Facility: HOSPITAL | Age: 20
Discharge: HOME | End: 2025-02-05
Attending: STUDENT IN AN ORGANIZED HEALTH CARE EDUCATION/TRAINING PROGRAM
Payer: COMMERCIAL

## 2025-02-05 VITALS
RESPIRATION RATE: 20 BRPM | WEIGHT: 252.19 LBS | DIASTOLIC BLOOD PRESSURE: 81 MMHG | SYSTOLIC BLOOD PRESSURE: 133 MMHG | BODY MASS INDEX: 39.58 KG/M2 | HEART RATE: 84 BPM | TEMPERATURE: 98 F | HEIGHT: 67 IN | OXYGEN SATURATION: 98 %

## 2025-02-05 DIAGNOSIS — N93.9 ABNORMAL UTERINE BLEEDING: ICD-10-CM

## 2025-02-05 DIAGNOSIS — S39.012A STRAIN OF LUMBAR REGION, INITIAL ENCOUNTER: ICD-10-CM
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
## 2025-02-05 LAB — B-HCG UR QL: NEGATIVE

## 2025-02-05 PROCEDURE — 81025 URINE PREGNANCY TEST: CPT | Performed by: STUDENT IN AN ORGANIZED HEALTH CARE EDUCATION/TRAINING PROGRAM

## 2025-02-05 PROCEDURE — 25000003 PHARM REV CODE 250: Performed by: STUDENT IN AN ORGANIZED HEALTH CARE EDUCATION/TRAINING PROGRAM

## 2025-02-05 PROCEDURE — 96372 THER/PROPH/DIAG INJ SC/IM: CPT | Performed by: STUDENT IN AN ORGANIZED HEALTH CARE EDUCATION/TRAINING PROGRAM

## 2025-02-05 PROCEDURE — 99284 EMERGENCY DEPT VISIT MOD MDM: CPT | Mod: 25

## 2025-02-05 PROCEDURE — 63600175 PHARM REV CODE 636 W HCPCS: Mod: JZ,TB,UD | Performed by: STUDENT IN AN ORGANIZED HEALTH CARE EDUCATION/TRAINING PROGRAM

## 2025-02-05 RX ORDER — METHOCARBAMOL 500 MG/1
1000 TABLET, FILM COATED ORAL
Status: COMPLETED | OUTPATIENT
Start: 2025-02-05 | End: 2025-02-05

## 2025-02-05 RX ORDER — LIDOCAINE 50 MG/G
1 PATCH TOPICAL
Status: DISCONTINUED | OUTPATIENT
Start: 2025-02-05 | End: 2025-02-05 | Stop reason: HOSPADM

## 2025-02-05 RX ORDER — IBUPROFEN 800 MG/1
800 TABLET ORAL EVERY 6 HOURS PRN
Qty: 20 TABLET | Refills: 0 | Status: SHIPPED | OUTPATIENT
Start: 2025-02-05

## 2025-02-05 RX ORDER — METHOCARBAMOL 500 MG/1
1000 TABLET, FILM COATED ORAL 3 TIMES DAILY
Qty: 30 TABLET | Refills: 0 | Status: SHIPPED | OUTPATIENT
Start: 2025-02-05 | End: 2025-02-10

## 2025-02-05 RX ORDER — DIAZEPAM 5 MG/1
TABLET ORAL
COMMUNITY
Start: 2024-10-03 | End: 2025-02-10

## 2025-02-05 RX ORDER — LIDOCAINE 50 MG/G
1 PATCH TOPICAL DAILY PRN
Qty: 15 PATCH | Refills: 0 | Status: SHIPPED | OUTPATIENT
Start: 2025-02-05 | End: 2025-02-10

## 2025-02-05 RX ORDER — KETOROLAC TROMETHAMINE 30 MG/ML
30 INJECTION, SOLUTION INTRAMUSCULAR; INTRAVENOUS
Status: COMPLETED | OUTPATIENT
Start: 2025-02-05 | End: 2025-02-05

## 2025-02-05 RX ADMIN — LIDOCAINE 1 PATCH: 700 PATCH TOPICAL at 08:02

## 2025-02-05 RX ADMIN — METHOCARBAMOL 1000 MG: 500 TABLET ORAL at 08:02

## 2025-02-05 RX ADMIN — KETOROLAC TROMETHAMINE 30 MG: 30 INJECTION, SOLUTION INTRAMUSCULAR; INTRAVENOUS at 08:02
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
## 2025-03-21 DIAGNOSIS — M25.531 BILATERAL WRIST PAIN: Primary | ICD-10-CM

## 2025-03-21 DIAGNOSIS — M25.532 BILATERAL WRIST PAIN: Primary | ICD-10-CM

## 2025-03-24 ENCOUNTER — OFFICE VISIT (OUTPATIENT)
Dept: ORTHOPEDICS | Facility: CLINIC | Age: 20
End: 2025-03-24
Payer: COMMERCIAL

## 2025-03-24 ENCOUNTER — HOSPITAL ENCOUNTER (OUTPATIENT)
Dept: RADIOLOGY | Facility: HOSPITAL | Age: 20
Discharge: HOME | End: 2025-03-24
Attending: ORTHOPAEDIC SURGERY
Payer: COMMERCIAL

## 2025-03-24 VITALS — BODY MASS INDEX: 40.18 KG/M2 | HEIGHT: 67 IN | WEIGHT: 256 LBS

## 2025-03-24 DIAGNOSIS — M25.532 BILATERAL WRIST PAIN: Primary | ICD-10-CM

## 2025-03-24 DIAGNOSIS — M25.531 BILATERAL WRIST PAIN: ICD-10-CM

## 2025-03-24 DIAGNOSIS — M25.532 BILATERAL WRIST PAIN: ICD-10-CM

## 2025-03-24 DIAGNOSIS — M25.531 BILATERAL WRIST PAIN: Primary | ICD-10-CM

## 2025-03-24 PROCEDURE — 3008F BODY MASS INDEX DOCD: CPT | Mod: S$GLB,,, | Performed by: ORTHOPAEDIC SURGERY

## 2025-03-24 PROCEDURE — 73110 X-RAY EXAM OF WRIST: CPT | Mod: TC,50,PN

## 2025-03-24 PROCEDURE — 99999 PR PBB SHADOW E&M-EST. PATIENT-LVL III: CPT | Mod: PBBFAC,,, | Performed by: ORTHOPAEDIC SURGERY

## 2025-03-24 PROCEDURE — 99204 OFFICE O/P NEW MOD 45 MIN: CPT | Mod: S$GLB,,, | Performed by: ORTHOPAEDIC SURGERY

## 2025-03-24 PROCEDURE — 1159F MED LIST DOCD IN RCRD: CPT | Mod: S$GLB,,, | Performed by: ORTHOPAEDIC SURGERY

## 2025-03-24 PROCEDURE — 1160F RVW MEDS BY RX/DR IN RCRD: CPT | Mod: S$GLB,,, | Performed by: ORTHOPAEDIC SURGERY

## 2025-03-24 PROCEDURE — 73110 X-RAY EXAM OF WRIST: CPT | Mod: 26,,, | Performed by: RADIOLOGY
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
## 2025-04-08 ENCOUNTER — HOSPITAL ENCOUNTER (OUTPATIENT)
Dept: RADIOLOGY | Facility: HOSPITAL | Age: 20
Discharge: HOME | End: 2025-04-08
Attending: NURSE PRACTITIONER
Payer: COMMERCIAL

## 2025-04-08 ENCOUNTER — OFFICE VISIT (OUTPATIENT)
Dept: GASTROENTEROLOGY | Facility: CLINIC | Age: 20
End: 2025-04-08
Payer: COMMERCIAL

## 2025-04-08 VITALS — HEIGHT: 67 IN | BODY MASS INDEX: 40.42 KG/M2 | WEIGHT: 257.5 LBS

## 2025-04-08 DIAGNOSIS — R10.32 LEFT LOWER QUADRANT PAIN: ICD-10-CM

## 2025-04-08 DIAGNOSIS — R10.32 LEFT LOWER QUADRANT PAIN: Primary | ICD-10-CM

## 2025-04-08 DIAGNOSIS — K92.1 HEMATOCHEZIA: ICD-10-CM

## 2025-04-08 DIAGNOSIS — Z87.19 HISTORY OF HEMORRHOIDS: ICD-10-CM

## 2025-04-08 PROCEDURE — 25500020 PHARM REV CODE 255

## 2025-04-08 PROCEDURE — 1159F MED LIST DOCD IN RCRD: CPT | Mod: CPTII,S$GLB,, | Performed by: NURSE PRACTITIONER

## 2025-04-08 PROCEDURE — 74177 CT ABD & PELVIS W/CONTRAST: CPT | Mod: 26,,, | Performed by: RADIOLOGY

## 2025-04-08 PROCEDURE — A9698 NON-RAD CONTRAST MATERIALNOC: HCPCS

## 2025-04-08 PROCEDURE — 74177 CT ABD & PELVIS W/CONTRAST: CPT | Mod: TC

## 2025-04-08 PROCEDURE — 3008F BODY MASS INDEX DOCD: CPT | Mod: CPTII,S$GLB,, | Performed by: NURSE PRACTITIONER

## 2025-04-08 PROCEDURE — 1160F RVW MEDS BY RX/DR IN RCRD: CPT | Mod: CPTII,S$GLB,, | Performed by: NURSE PRACTITIONER

## 2025-04-08 PROCEDURE — 99204 OFFICE O/P NEW MOD 45 MIN: CPT | Mod: S$GLB,,, | Performed by: NURSE PRACTITIONER

## 2025-04-08 PROCEDURE — 99999 PR PBB SHADOW E&M-EST. PATIENT-LVL III: CPT | Mod: PBBFAC,,, | Performed by: NURSE PRACTITIONER

## 2025-04-08 RX ADMIN — IOHEXOL 100 ML: 350 INJECTION, SOLUTION INTRAVENOUS at 05:04

## 2025-04-08 RX ADMIN — IOHEXOL 1000 ML: 12 SOLUTION ORAL at 05:04

## 2025-04-08 NOTE — PROGRESS NOTES
Subjective:       Patient ID: Tatiana Cross is a 19 y.o. female, Body mass index is 40.33 kg/m².    Chief Complaint: Rectal Bleeding      Patient is new to me. Former patient of Dr. Eaton.    Abdominal Pain  This is a new problem. The current episode started in the past 7 days. The onset quality is sudden. The problem occurs constantly (fluctuates in severity). The problem has been unchanged. The pain is located in the LLQ. The quality of the pain is aching (describes as pressure). The abdominal pain does not radiate. Associated symptoms include hematochezia (chronic problem; intermittent; reports bright red blood in toilet bowl after bowel movements; occurred this AM; hx of hemorrhoids) and nausea. Pertinent negatives include no anorexia, belching, constipation, diarrhea, dysuria, fever, flatus, frequency, melena, vomiting or weight loss. The pain is aggravated by palpation. The pain is relieved by Nothing. She has tried nothing for the symptoms. Prior diagnostic workup includes GI consult and lower endoscopy. There is no history of abdominal surgery, colon cancer, Crohn's disease, gallstones, GERD, irritable bowel syndrome, pancreatitis, PUD or ulcerative colitis.     Review of Systems   Constitutional:  Negative for appetite change, fever, unexpected weight change and weight loss.   HENT:  Negative for trouble swallowing.    Respiratory:  Negative for cough and shortness of breath.    Cardiovascular:  Negative for chest pain.   Gastrointestinal:  Positive for abdominal pain, anal bleeding, blood in stool, hematochezia (chronic problem; intermittent; reports bright red blood in toilet bowl after bowel movements; occurred this AM; hx of hemorrhoids) and nausea. Negative for abdominal distention, anorexia, constipation, diarrhea, flatus, melena, rectal pain and vomiting.   Genitourinary:  Negative for difficulty urinating, dysuria and frequency.   Musculoskeletal:  Negative for gait problem.   Skin:  Negative for  rash.   Neurological:  Negative for speech difficulty.   Psychiatric/Behavioral:  Negative for confusion.        Past Medical History:   Diagnosis Date    Anxiety and depression     Asthma     Attention-deficit hyperactivity disorder, unspecified type     Chronic tonsillitis and adenoiditis     Herpes simplex virus (HSV) infection     Hypertrophy of nasal turbinates     Obesity, unspecified     Unspecified hemorrhoids       Past Surgical History:   Procedure Laterality Date    COLONOSCOPY N/A 05/27/2020    Procedure: COLONOSCOPY;  Surgeon: Bunny Eaton MD;  Location: [facility] ENDO;  Service: Endoscopy;  Laterality: N/A;    COLONOSCOPY      EXCISION, NASAL TURBINATE, SUBMUCOSAL Bilateral 6/2/2023    Procedure: EXCISION, NASAL TURBINATE, SUBMUCOSAL;  Surgeon: Víctor Costa MD;  Location: [facility] OR;  Service: ENT;  Laterality: Bilateral;    TONSILLECTOMY, ADENOIDECTOMY Bilateral 6/2/2023    Procedure: TONSILLECTOMY AND ADENOIDECTOMY;  Surgeon: Víctor Costa MD;  Location: [facility] OR;  Service: ENT;  Laterality: Bilateral;      Family History   Problem Relation Name Age of Onset    Diabetes Paternal Grandfather      Heart disease Paternal Grandmother      Hypertension Father      Thyroid disease Mother        Wt Readings from Last 10 Encounters:   04/08/25 116.8 kg (257 lb 8 oz)   03/24/25 116.1 kg (256 lb)   02/10/25 118.8 kg (262 lb)   02/05/25 114.4 kg (252 lb 3.3 oz)   01/29/25 116.4 kg (256 lb 9.9 oz)   01/16/25 116.4 kg (256 lb 11.2 oz)   01/09/25 113.4 kg (250 lb)   02/09/24 115.7 kg (255 lb)   02/08/24 115.7 kg (255 lb)   12/05/23 116.6 kg (257 lb)     Lab Results   Component Value Date    WBC 7.86 01/09/2025    HGB 14.4 01/09/2025    HCT 40.5 01/09/2025    MCV 88 01/09/2025     01/09/2025     CMP  Sodium   Date Value Ref Range Status   01/09/2025 137 136 - 145 mmol/L Final     Potassium   Date Value Ref Range Status   01/09/2025 3.8 3.5 - 5.1 mmol/L Final     Chloride   Date Value Ref Range Status    01/09/2025 106 95 - 110 mmol/L Final     CO2   Date Value Ref Range Status   01/09/2025 19 (L) 23 - 29 mmol/L Final     Glucose   Date Value Ref Range Status   01/09/2025 84 70 - 110 mg/dL Final     BUN   Date Value Ref Range Status   01/09/2025 12 6 - 20 mg/dL Final     Creatinine   Date Value Ref Range Status   01/09/2025 0.7 0.5 - 1.4 mg/dL Final     Calcium   Date Value Ref Range Status   01/09/2025 9.7 8.7 - 10.5 mg/dL Final     Total Protein   Date Value Ref Range Status   01/09/2025 7.5 6.0 - 8.4 g/dL Final     Albumin   Date Value Ref Range Status   01/09/2025 4.4 3.5 - 5.2 g/dL Final     Total Bilirubin   Date Value Ref Range Status   01/09/2025 0.6 0.1 - 1.0 mg/dL Final     Comment:     For infants and newborns, interpretation of results should be based  on gestational age, weight and in agreement with clinical  observations.    Premature Infant recommended reference ranges:  Up to 24 hours.............<8.0 mg/dL  Up to 48 hours............<12.0 mg/dL  3-5 days..................<15.0 mg/dL  6-29 days.................<15.0 mg/dL       Alkaline Phosphatase   Date Value Ref Range Status   01/09/2025 64 40 - 150 U/L Final     AST   Date Value Ref Range Status   01/09/2025 28 10 - 40 U/L Final     ALT   Date Value Ref Range Status   01/09/2025 41 10 - 44 U/L Final     Anion Gap   Date Value Ref Range Status   01/09/2025 12 8 - 16 mmol/L Final     eGFR if    Date Value Ref Range Status   11/08/2021 >60.0 >60 mL/min/1.73 m^2 Final     eGFR if non    Date Value Ref Range Status   11/08/2021 >60.0 >60 mL/min/1.73 m^2 Final     Comment:     Calculation used to obtain the estimated glomerular filtration  rate (eGFR) is the CKD-EPI equation.                 Reviewed prior medical records including radiology report of US of abdomen 11/9/21 & endoscopy history (see surgical history).     Objective:      Physical Exam  Constitutional:       General: She is not in acute distress.      Appearance: She is well-developed.   HENT:      Head: Normocephalic.      Right Ear: Hearing normal.      Left Ear: Hearing normal.      Nose: Nose normal.      Mouth/Throat:      Mouth: No oral lesions.      Pharynx: Uvula midline.   Eyes:      General: Lids are normal.      Conjunctiva/sclera: Conjunctivae normal.      Pupils: Pupils are equal, round, and reactive to light.   Neck:      Trachea: Trachea normal.   Cardiovascular:      Rate and Rhythm: Normal rate and regular rhythm.      Heart sounds: Normal heart sounds. No murmur heard.  Pulmonary:      Effort: Pulmonary effort is normal. No respiratory distress.      Breath sounds: Normal breath sounds. No stridor. No wheezing.   Abdominal:      General: Bowel sounds are normal. There is no distension.      Palpations: Abdomen is soft. There is no mass.      Tenderness: There is abdominal tenderness (mild) in the left lower quadrant. There is no guarding or rebound.   Musculoskeletal:         General: Normal range of motion.      Cervical back: Normal range of motion.   Skin:     General: Skin is warm and dry.      Findings: No rash.      Comments: Non jaundiced   Neurological:      Mental Status: She is alert and oriented to person, place, and time.   Psychiatric:         Speech: Speech normal.         Behavior: Behavior normal. Behavior is cooperative.         Assessment:       1. Left lower quadrant pain    2. Hematochezia    3. History of hemorrhoids           Plan:   All diagnoses and orders for this visit:    Left lower quadrant pain  - CT Abdomen Pelvis With IV Contrast Routine Oral Contrast; Future; Expected date: 04/08/2025  - Pregnancy, urine rapid; Future  - CBC Auto Differential; Future; Expected date: 04/08/2025  - Comprehensive Metabolic Panel; Future; Expected date: 04/08/2025  - Schedule Colonoscopy     Hematochezia & History of hemorrhoids        - CBC Auto Differential; Future; Expected date: 04/08/2025  - Discussed about different etiologies  that can cause rectal bleeding, such as but not limited to diverticulosis, polyps, colon mass, colon inflammation or infection, anal fissure or hemorrhoids.   - Schedule Colonoscopy, discussed procedure with the patient, verbalized understanding         - Avoid/minimize aspirin and anti-inflammatory drugs such as ibuprofen (Advil, Motrin) and naproxen (Aleve and Naprosyn).     If no improvement in symptoms or symptoms worsen, call/follow-up at clinic or go to ER

## 2025-04-10 ENCOUNTER — RESULTS FOLLOW-UP (OUTPATIENT)
Dept: GASTROENTEROLOGY | Facility: CLINIC | Age: 20
End: 2025-04-10

## 2025-04-10 ENCOUNTER — TELEPHONE (OUTPATIENT)
Dept: GASTROENTEROLOGY | Facility: CLINIC | Age: 20
End: 2025-04-10
Payer: COMMERCIAL

## 2025-04-10 DIAGNOSIS — R93.5 ABNORMAL CT OF THE ABDOMEN: ICD-10-CM

## 2025-04-10 DIAGNOSIS — N13.30 HYDRONEPHROSIS OF RIGHT KIDNEY: Primary | ICD-10-CM

## 2025-04-10 NOTE — TELEPHONE ENCOUNTER
Let patient know her CT of abdomen and pelvis showed a left ovarian cyst and hydronephrosis of right kidney; otherwise, unremarkable. Recommend she follow-up with OB/GYN for further management of left ovarian cyst (already established) and referral to urology for further evaluation of right kidney hydronephrosis.

## 2025-04-10 NOTE — TELEPHONE ENCOUNTER
Spoke with pt. Informed of results & recommendations per Paige Barbosa NP. Pt verbalized understanding.   Pt stated she started bleeding and doesn't know if it is her menstrual cycle or what is going on advised pt if she continues to bleed she can go into ER or urgent care. Pt stated she usually does not get a period.

## 2025-04-21 ENCOUNTER — HOSPITAL ENCOUNTER (OUTPATIENT)
Facility: HOSPITAL | Age: 20
Discharge: HOME | End: 2025-04-21
Attending: STUDENT IN AN ORGANIZED HEALTH CARE EDUCATION/TRAINING PROGRAM | Admitting: STUDENT IN AN ORGANIZED HEALTH CARE EDUCATION/TRAINING PROGRAM
Payer: COMMERCIAL

## 2025-04-21 ENCOUNTER — ANESTHESIA EVENT (OUTPATIENT)
Dept: ENDOSCOPY | Facility: HOSPITAL | Age: 20
End: 2025-04-21
Payer: COMMERCIAL

## 2025-04-21 ENCOUNTER — ANESTHESIA (OUTPATIENT)
Dept: ENDOSCOPY | Facility: HOSPITAL | Age: 20
End: 2025-04-21
Payer: COMMERCIAL

## 2025-04-21 VITALS
OXYGEN SATURATION: 100 % | RESPIRATION RATE: 16 BRPM | HEIGHT: 67 IN | BODY MASS INDEX: 40.34 KG/M2 | HEART RATE: 80 BPM | TEMPERATURE: 99 F | SYSTOLIC BLOOD PRESSURE: 105 MMHG | WEIGHT: 257 LBS | DIASTOLIC BLOOD PRESSURE: 60 MMHG

## 2025-04-21 DIAGNOSIS — K92.1 HEMATOCHEZIA: ICD-10-CM

## 2025-04-21 LAB
B-HCG UR QL: NEGATIVE
CTP QC/QA: YES

## 2025-04-21 PROCEDURE — 37000008 HC ANESTHESIA 1ST 15 MINUTES: Performed by: STUDENT IN AN ORGANIZED HEALTH CARE EDUCATION/TRAINING PROGRAM

## 2025-04-21 PROCEDURE — 81025 URINE PREGNANCY TEST: CPT | Performed by: STUDENT IN AN ORGANIZED HEALTH CARE EDUCATION/TRAINING PROGRAM

## 2025-04-21 PROCEDURE — 45378 DIAGNOSTIC COLONOSCOPY: CPT | Mod: ,,, | Performed by: STUDENT IN AN ORGANIZED HEALTH CARE EDUCATION/TRAINING PROGRAM

## 2025-04-21 PROCEDURE — 63600175 PHARM REV CODE 636 W HCPCS: Performed by: STUDENT IN AN ORGANIZED HEALTH CARE EDUCATION/TRAINING PROGRAM

## 2025-04-21 PROCEDURE — 45378 DIAGNOSTIC COLONOSCOPY: CPT | Performed by: STUDENT IN AN ORGANIZED HEALTH CARE EDUCATION/TRAINING PROGRAM

## 2025-04-21 PROCEDURE — 37000009 HC ANESTHESIA EA ADD 15 MINS: Performed by: STUDENT IN AN ORGANIZED HEALTH CARE EDUCATION/TRAINING PROGRAM

## 2025-04-21 PROCEDURE — D9220A PRA ANESTHESIA: Mod: CRNA,,, | Performed by: STUDENT IN AN ORGANIZED HEALTH CARE EDUCATION/TRAINING PROGRAM

## 2025-04-21 PROCEDURE — D9220A PRA ANESTHESIA: Mod: ANES,,, | Performed by: ANESTHESIOLOGY

## 2025-04-21 PROCEDURE — 25000003 PHARM REV CODE 250: Mod: UD | Performed by: STUDENT IN AN ORGANIZED HEALTH CARE EDUCATION/TRAINING PROGRAM

## 2025-04-21 RX ORDER — BUPROPION HYDROCHLORIDE 150 MG/1
150 TABLET ORAL DAILY
COMMUNITY

## 2025-04-21 RX ORDER — PROPOFOL 10 MG/ML
VIAL (ML) INTRAVENOUS
Status: DISCONTINUED | OUTPATIENT
Start: 2025-04-21 | End: 2025-04-21

## 2025-04-21 RX ORDER — SODIUM CHLORIDE 9 MG/ML
INJECTION, SOLUTION INTRAVENOUS CONTINUOUS
Status: DISCONTINUED | OUTPATIENT
Start: 2025-04-21 | End: 2025-04-21 | Stop reason: HOSPADM

## 2025-04-21 RX ORDER — LIDOCAINE HYDROCHLORIDE 20 MG/ML
INJECTION INTRAVENOUS
Status: DISCONTINUED | OUTPATIENT
Start: 2025-04-21 | End: 2025-04-21

## 2025-04-21 RX ADMIN — GLYCOPYRROLATE 0.2 MG: 0.2 INJECTION, SOLUTION INTRAMUSCULAR; INTRAVITREAL at 12:04

## 2025-04-21 RX ADMIN — PROPOFOL 100 MG: 10 INJECTION, EMULSION INTRAVENOUS at 01:04

## 2025-04-21 RX ADMIN — PROPOFOL 50 MG: 10 INJECTION, EMULSION INTRAVENOUS at 01:04

## 2025-04-21 RX ADMIN — PROPOFOL 20 MG: 10 INJECTION, EMULSION INTRAVENOUS at 01:04

## 2025-04-21 RX ADMIN — LIDOCAINE HYDROCHLORIDE 80 MG: 20 INJECTION, SOLUTION INTRAVENOUS at 01:04

## 2025-04-21 RX ADMIN — SODIUM CHLORIDE: 9 INJECTION, SOLUTION INTRAVENOUS at 11:04

## 2025-04-21 NOTE — TRANSFER OF CARE
"Anesthesia Transfer of Care Note    Patient: Tatiana Cross    Procedure(s) Performed: Procedure(s) (LRB):  [id number] to keep time for 1100 arrival bc of ride) (N/A)    Patient location: GI    Anesthesia Type: general    Transport from OR: Transported from OR on room air with adequate spontaneous ventilation    Post pain: adequate analgesia    Post assessment: no apparent anesthetic complications    Post vital signs: stable    Level of consciousness: lethargic and responds to stimulation    Nausea/Vomiting: no nausea/vomiting    Complications: none    Transfer of care protocol was followed      Last vitals: Visit Vitals  /73 (BP Location: Left arm, Patient Position: Lying)   Pulse 72   Temp 37.1 °C (98.8 °F) (Skin)   Resp 16   Ht 5' 7" (1.702 m)   Wt 116.6 kg (257 lb)   LMP  (LMP Unknown)   SpO2 97%   Breastfeeding No   BMI 40.25 kg/m²     "

## 2025-04-21 NOTE — ANESTHESIA POSTPROCEDURE EVALUATION
Anesthesia Post Evaluation    Patient: Tatiana Cross    Procedure(s) Performed: Procedure(s) (LRB):  [id number] to keep time for 1100 arrival bc of ride) (N/A)    Final Anesthesia Type: general      Patient location during evaluation: PACU  Patient participation: Yes- Able to Participate  Level of consciousness: awake and alert  Post-procedure vital signs: reviewed and stable  Pain management: adequate  Airway patency: patent    PONV status at discharge: No PONV  Anesthetic complications: no      Cardiovascular status: blood pressure returned to baseline  Respiratory status: unassisted  Hydration status: euvolemic  Follow-up not needed.              Vitals Value Taken Time   /60 04/21/25 13:50   Temp 37.1 °C (98.8 °F) 04/21/25 13:50   Pulse 80 04/21/25 13:50   Resp 16 04/21/25 13:50   SpO2 100 % 04/21/25 13:50         Event Time   Out of Recovery 14:01:06         Pain/Ulis Score: Luis Score: 10 (4/21/2025  1:53 PM)

## 2025-04-21 NOTE — PLAN OF CARE
Vss, vilma po fluids, denies pain, ambulates easily. IV removed, catheter intact. Discharge instructions provided and states understanding. States ready to go home.  Discharged from facility with family per wheelchair.

## 2025-04-21 NOTE — ANESTHESIA PREPROCEDURE EVALUATION
04/21/2025  Tatiana Cross is a 19 y.o., female.      Pre-op Assessment    I have reviewed the Patient Summary Reports.     I have reviewed the Nursing Notes. I have reviewed the NPO Status.   I have reviewed the Medications.     Review of Systems  Anesthesia Hx:             Denies Family Hx of Anesthesia complications.    Denies Personal Hx of Anesthesia complications.                    Hematology/Oncology:       -- Anemia:                                  Pulmonary:    Asthma mild                   Hepatic/GI:  Bowel Prep.                   Endocrine:        Morbid Obesity / BMI > 40  Psych:   anxiety depression                Physical Exam  General: Cooperative, Alert and Oriented    Airway:  Mallampati: II         Anesthesia Plan  Type of Anesthesia, risks & benefits discussed:    Anesthesia Type: Gen Natural Airway  Intra-op Monitoring Plan: Standard ASA Monitors  Induction:  IV  Informed Consent: Informed consent signed with the Patient and all parties understand the risks and agree with anesthesia plan.  All questions answered.   ASA Score: 2    Ready For Surgery From Anesthesia Perspective.     .

## 2025-04-21 NOTE — [employer] PATIENT INSTRUCTIONS
Discharge Summary/Instructions after an Endoscopic Procedure  Patient Name: Tatiana Cross  Patient MRN: [id number]  Patient YOB: 2005 Monday, April 21, 2025  Fabio Christiansen DO  Dear patient,  As a result of recent federal legislation (The Federal Cures Act), you may   receive lab or pathology results from your procedure in your [facility]   account before your physician is able to contact you. Your physician or   their representative will relay the results to you with their   recommendations at their soonest availability.  Thank you,  RESTRICTIONS:  During your procedure today, you received medications for sedation.  These   medications may affect your judgment, balance and coordination.  Therefore,   for 24 hours, you have the following restrictions:   - DO NOT drive a car, operate machinery, make legal/financial decisions,   sign important papers or drink alcohol.    ACTIVITY:  Today: no heavy lifting, straining or running due to procedural   sedation/anesthesia.  The following day: return to full activity including work.  DIET:  Eat and drink normally unless instructed otherwise.     TREATMENT FOR COMMON SIDE EFFECTS:  - Mild abdominal pain, nausea, belching, bloating or excessive gas:  rest,   eat lightly and use a heating pad.  - Sore Throat: treat with throat lozenges and/or gargle with warm salt   water.  - Because air was used during the procedure, expelling large amounts of air   from your rectum or belching is normal.  - If a bowel prep was taken, you may not have a bowel movement for 1-3 days.    This is normal.  SYMPTOMS TO WATCH FOR AND REPORT TO YOUR PHYSICIAN:  1. Abdominal pain or bloating, other than gas cramps.  2. Chest pain.  3. Back pain.  4. Signs of infection such as: chills or fever occurring within 24 hours   after the procedure.  5. Rectal bleeding, which would show as bright red, maroon, or black stools.   (A tablespoon of blood from the rectum is not serious, especially  if   hemorrhoids are present.)  6. Vomiting.  7. Weakness or dizziness.  GO DIRECTLY TO THE NEAREST EMERGENCY ROOM IF YOU HAVE ANY OF THE FOLLOWING:      Difficulty breathing              Chills and/or fever over 101 F   Persistent vomiting and/or vomiting blood   Severe abdominal pain   Severe chest pain   Black, tarry stools   Bleeding- more than one tablespoon   Any other symptom or condition that you feel may need urgent attention  Your doctor recommends these additional instructions:  If any biopsies were taken, your doctors clinic will contact you in 1 to 2   weeks with any results.  - Patient has a contact number available for emergencies.  The signs and   symptoms of potential delayed complications were discussed with the   patient.  Return to normal activities tomorrow.  Written discharge   instructions were provided to the patient.   - High fiber diet.   - Continue present medications.   - Use original regular Metamucil one teaspoon PO daily.   - Return to nurse practitioner as previously scheduled.   - Discharge patient to home.   - Repeat colonoscopy at age 43 for screening purposes.  For questions, problems or results please call your physician - Fabio Christiansen DO at Work:  [phone].  [facility], EMERGENCY ROOM PHONE NUMBER: [phone]  IF A COMPLICATION OR EMERGENCY SITUATION ARISES AND YOU ARE UNABLE TO REACH   YOUR PHYSICIAN - GO DIRECTLY TO THE EMERGENCY ROOM.  Fabio Christiansen DO  4/21/2025 1:33:11 PM  This report has been verified and signed electronically.  Dear patient,  As a result of recent federal legislation (The Federal Cures Act), you may   receive lab or pathology results from your procedure in your [facility]   account before your physician is able to contact you. Your physician or   their representative will relay the results to you with their   recommendations at their soonest availability.  Thank you,  [employer]ATION

## 2025-04-21 NOTE — H&P
History & Physical - Short Stay  Gastroenterology      SUBJECTIVE:     Procedure: Colonoscopy    Chief Complaint/Indication for Procedure: Hematochezia    Facility-Administered Medications Prior to Admission   Medication    etonogestreL subdermal device 68 mg     PTA Medications   Medication Sig    albuterol (PROVENTIL/VENTOLIN HFA) 90 mcg/actuation inhaler Inhale 2 puffs into the lungs every 4 (four) hours as needed.    buPROPion (WELLBUTRIN XL) 150 MG TB24 tablet Take 150 mg by mouth once daily.    busPIRone (BUSPAR) 5 MG Tab Take 5 mg by mouth 2 (two) times daily.    dextroamphetamine-amphetamine (ADDERALL XR) 15 MG 24 hr capsule Take 15 mg by mouth every morning.       Review of patient's allergies indicates:  No Known Allergies     Past Medical History:   Diagnosis Date    Anxiety and depression     Asthma     Attention-deficit hyperactivity disorder, unspecified type     Chronic tonsillitis and adenoiditis     Herpes simplex virus (HSV) infection     Hypertrophy of nasal turbinates     Obesity, unspecified     Unspecified hemorrhoids      Past Surgical History:   Procedure Laterality Date    COLONOSCOPY N/A 05/27/2020    Procedure: COLONOSCOPY;  Surgeon: Bunny Eaton MD;  Location: [facility];  Service: Endoscopy;  Laterality: N/A;    COLONOSCOPY      EXCISION, NASAL TURBINATE, SUBMUCOSAL Bilateral 6/2/2023    Procedure: EXCISION, NASAL TURBINATE, SUBMUCOSAL;  Surgeon: Víctor Costa MD;  Location: [facility] OR;  Service: ENT;  Laterality: Bilateral;    TONSILLECTOMY, ADENOIDECTOMY Bilateral 6/2/2023    Procedure: TONSILLECTOMY AND ADENOIDECTOMY;  Surgeon: Víctor Costa MD;  Location: [facility] OR;  Service: ENT;  Laterality: Bilateral;     Family History   Problem Relation Name Age of Onset    Diabetes Paternal Grandfather      Heart disease Paternal Grandmother      Hypertension Father      Thyroid disease Mother       Social History[1]      OBJECTIVE:     Vital Signs (Most Recent)  Temp: 98.8 °F (37.1 °C)  (04/21/25 1128)  Pulse: 72 (04/21/25 1128)  Resp: 16 (04/21/25 1128)  BP: 126/73 (04/21/25 1128)  SpO2: 97 % (04/21/25 1128)    Physical Exam:                                                       GENERAL:  Comfortable, in no acute distress.                                 HEENT EXAM:  Nonicteric.  No adenopathy.  Oropharynx is clear.               NECK:  Supple.                                                               LUNGS:  Clear.                                                               CARDIAC:  Regular rate and rhythm.  S1, S2.  No murmur.                      ABDOMEN:  Soft, positive bowel sounds, nontender.  No hepatosplenomegaly or masses.  No rebound or guarding.                                             EXTREMITIES:  No edema.     MENTAL STATUS:  Normal, alert and oriented.      ASSESSMENT/PLAN:     Assessment: Hematochezia    Plan: Colonoscopy    Anesthesia Plan: MAC    ASA Grade: ASA 1 - Normal health patient    MALLAMPATI SCORE:  I (soft palate, uvula, fauces, and tonsillar pillars visible)           [1]   Social History  Tobacco Use    Smoking status: Some Days     Types: Vaping with nicotine     Passive exposure: Current    Smokeless tobacco: Never   Substance Use Topics    Alcohol use: Yes     Comment: occ    Drug use: Never

## 2025-05-08 ENCOUNTER — OFFICE VISIT (OUTPATIENT)
Dept: UROLOGY | Facility: CLINIC | Age: 20
End: 2025-05-08
Payer: COMMERCIAL

## 2025-05-08 VITALS — DIASTOLIC BLOOD PRESSURE: 82 MMHG | HEART RATE: 96 BPM | SYSTOLIC BLOOD PRESSURE: 116 MMHG

## 2025-05-08 DIAGNOSIS — N13.30 HYDRONEPHROSIS OF RIGHT KIDNEY: Primary | ICD-10-CM

## 2025-05-08 DIAGNOSIS — R93.5 ABNORMAL CT OF THE ABDOMEN: ICD-10-CM

## 2025-05-08 LAB
BILIRUBIN, UA POC OHS: ABNORMAL
BLOOD, UA POC OHS: NEGATIVE
CLARITY, UA POC OHS: CLEAR
COLOR, UA POC OHS: ABNORMAL
GLUCOSE, UA POC OHS: NEGATIVE
KETONES, UA POC OHS: ABNORMAL
LEUKOCYTES, UA POC OHS: NEGATIVE
NITRITE, UA POC OHS: NEGATIVE
PH, UA POC OHS: 5.5
POC RESIDUAL URINE VOLUME: 14 ML (ref 0–100)
PROTEIN, UA POC OHS: ABNORMAL
SPECIFIC GRAVITY, UA POC OHS: >=1.03
UROBILINOGEN, UA POC OHS: 0.2

## 2025-05-08 PROCEDURE — 51798 US URINE CAPACITY MEASURE: CPT | Mod: S$GLB,,, | Performed by: UROLOGY

## 2025-05-08 PROCEDURE — 3074F SYST BP LT 130 MM HG: CPT | Mod: CPTII,S$GLB,, | Performed by: UROLOGY

## 2025-05-08 PROCEDURE — 99204 OFFICE O/P NEW MOD 45 MIN: CPT | Mod: S$GLB,,, | Performed by: UROLOGY

## 2025-05-08 PROCEDURE — 1159F MED LIST DOCD IN RCRD: CPT | Mod: CPTII,S$GLB,, | Performed by: UROLOGY

## 2025-05-08 PROCEDURE — 81003 URINALYSIS AUTO W/O SCOPE: CPT | Mod: QW,S$GLB,, | Performed by: UROLOGY

## 2025-05-08 PROCEDURE — 99999 PR PBB SHADOW E&M-EST. PATIENT-LVL IV: CPT | Mod: PBBFAC,,, | Performed by: UROLOGY

## 2025-05-08 PROCEDURE — 3079F DIAST BP 80-89 MM HG: CPT | Mod: CPTII,S$GLB,, | Performed by: UROLOGY

## 2025-05-08 PROCEDURE — 1160F RVW MEDS BY RX/DR IN RCRD: CPT | Mod: CPTII,S$GLB,, | Performed by: UROLOGY

## 2025-05-08 NOTE — PROGRESS NOTES
[facility] Urology New Patient/H&P:    Tatiana Cross is a 19 y.o. female who presents for right hydronephrosis.    Patient with a history of asthma and anxiety who was referred for incidental finding of mild right hydronephrosis.     She reported blood in her stool. CT abdomen pelvis with contrast on 4/8/25 with a 3.8 cm left ovarian cyst and mild hydronephrosis of the right kidney.    Of note, she underwent evaluation with GI and diagnosed with hemorrhoids.     Drinks mostly water and occasional soda and coffee. Urine dipstick with small bilirubin, trace ketone and trace protein. Just finished cycle.     Never smoked cigarettes    Denies any fever, chills, gross hematuria, flank pain, bone pain, unintentional weight loss,  trauma or history of  malignancy.       PVR  14 mL  5/8/25      Past Medical History:   Diagnosis Date    Anxiety and depression     Asthma     Attention-deficit hyperactivity disorder, unspecified type     Chronic tonsillitis and adenoiditis     Herpes simplex virus (HSV) infection     Hypertrophy of nasal turbinates     Obesity, unspecified     Unspecified hemorrhoids        Past Surgical History:   Procedure Laterality Date    COLONOSCOPY N/A 05/27/2020    Procedure: COLONOSCOPY;  Surgeon: Bunny Eaton MD;  Location: [facility] ENDO;  Service: Endoscopy;  Laterality: N/A;    COLONOSCOPY      COLONOSCOPY N/A 4/21/2025    Procedure: [id number] to keep time for 1100 arrival bc of ride);  Surgeon: Fabio Christiansen DO;  Location: [facility] ENDO;  Service: Endoscopy;  Laterality: N/A;    EXCISION, NASAL TURBINATE, SUBMUCOSAL Bilateral 6/2/2023    Procedure: EXCISION, NASAL TURBINATE, SUBMUCOSAL;  Surgeon: Víctor Costa MD;  Location: [facility] OR;  Service: ENT;  Laterality: Bilateral;    TONSILLECTOMY, ADENOIDECTOMY Bilateral 6/2/2023    Procedure: TONSILLECTOMY AND ADENOIDECTOMY;  Surgeon: Víctor Costa MD;  Location: [facility] OR;  Service: ENT;  Laterality: Bilateral;        Family History   Problem Relation Name Age of Onset    Diabetes Paternal Grandfather      Heart disease Paternal Grandmother      Hypertension Father      Thyroid disease Mother         Review of patient's allergies indicates:  No Known Allergies    Medications Reviewed: see MAR    PHYSICAL EXAM:    Vitals:    05/08/25 0935   BP: 116/82   Pulse: 96     There is no height or weight on file to calculate BMI.           General: Alert, cooperative, no distress, appears stated age  Abdomen: Soft, non-tender, no CVA tenderness, non-distended      LABS:    Recent Results (from the past 2 weeks)   POCT Urinalysis(Instrument)    Collection Time: 05/08/25  9:44 AM   Result Value Ref Range    Color, POC UA Anisa (A) Yellow, Straw, Colorless    Clarity, POC UA Clear Clear    Glucose, POC UA Negative Negative    Bilirubin, POC UA Small (A) Negative    Ketones, POC UA Trace (A) Negative    Spec Grav POC UA >=1.030 1.005 - 1.030    Blood, POC UA Negative Negative    pH, POC UA 5.5 5.0 - 8.0    Protein, POC UA Trace (A) Negative    Urobilinogen, POC UA 0.2 <=1.0    Nitrite, POC UA Negative Negative    WBC, POC UA Negative Negative   POCT Bladder Scan    Collection Time: 05/08/25 10:02 AM   Result Value Ref Range    POC Residual Urine Volume 14 0 - 100 mL             Assessment/Diagnosis:    1. Hydronephrosis of right kidney  Ambulatory referral/consult to Urology    POCT Urinalysis(Instrument)    US Retroperitoneal Complete    POCT Bladder Scan      2. Abnormal CT of the abdomen  Ambulatory referral/consult to Urology          Plans:    - I spent 45 minutes of the day of this encounter preparing for, treating and managing this patient. Extensive discussion with patient regarding the etiology and management of her mild right hydronephrosis on CT. Recommend renal ultrasound in 4 weeks to ensure resolution of hydronephrosis. Further plan contingent on results.

## (undated) DEVICE — TUBING SUCTION 3/16X10 2 CONN

## (undated) DEVICE — CANISTER SUCTION RIGID 3000CC

## (undated) DEVICE — KIT ENDOKIT COMPLIANCE CUSTOM

## (undated) DEVICE — CATH DOVER PVC URETH PVC 8FR

## (undated) DEVICE — GLOVE SURG ULTRA TOUCH 7

## (undated) DEVICE — DEFENDO

## (undated) DEVICE — SCRUB HIBICLENS 4% CHG 4OZ

## (undated) DEVICE — CANISTER SUCTION 3000CC

## (undated) DEVICE — SOL .9NACL PF 100 ML

## (undated) DEVICE — SPONGE PATTY SURGICAL .5X3IN

## (undated) DEVICE — SPONGE TONSIL MEDIUM

## (undated) DEVICE — NDL SPINAL 25GX3.5 SPINOCAN

## (undated) DEVICE — SOL 9P NACL IRR PIC IL

## (undated) DEVICE — GLOVE SURG ULTRA TOUCH 8

## (undated) DEVICE — VALVE ORCAPOD AIR WTR JET CONN

## (undated) DEVICE — SUT 2/0 30IN SILK BLK BRAI

## (undated) DEVICE — GLOVE SURGEONS ULTRA TOUCH 6.5

## (undated) DEVICE — SUCTION COAGULATOR 12FR 6IN

## (undated) DEVICE — PAD SUREFIT GRND ELECTRD 10FT

## (undated) DEVICE — BLADE INFERIOR TURBINATE 5/PK

## (undated) DEVICE — SYR B-D DISP CONTROL 10CC100/C

## (undated) DEVICE — BLADE SURGICAL GLASSVAN #12

## (undated) DEVICE — SPONGE GAUZE 16PLY 4X4

## (undated) DEVICE — PACK NASAL SINUS

## (undated) DEVICE — GOWN POLY REINF BRTH SLV LG

## (undated) DEVICE — SOL WATER STRL IRR 1000ML

## (undated) DEVICE — WIRE SNARE CTF TONSIL 4-1/2